# Patient Record
Sex: FEMALE | Race: WHITE | Employment: UNEMPLOYED | URBAN - METROPOLITAN AREA
[De-identification: names, ages, dates, MRNs, and addresses within clinical notes are randomized per-mention and may not be internally consistent; named-entity substitution may affect disease eponyms.]

---
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
## 2023-03-06 DIAGNOSIS — F41.1 GAD (GENERALIZED ANXIETY DISORDER): ICD-10-CM

## 2023-03-07 RX ORDER — BUSPIRONE HYDROCHLORIDE 5 MG/1
5 TABLET ORAL 2 TIMES DAILY
Qty: 60 TABLET | Refills: 1 | Status: SHIPPED | OUTPATIENT
Start: 2023-03-07

## 2023-03-07 NOTE — TELEPHONE ENCOUNTER
Patient mychart request for refill. Gael, ana    Last Visit: 11/22/22 NP Esteban  Next Appointment: 3/16/23 Esteban  Previous Refill Encounter(s): 10/12/22 60 + 1    Requested Prescriptions     Pending Prescriptions Disp Refills    busPIRone (BUSPAR) 5 mg tablet 60 Tablet 1     Sig: Take 1 Tablet by mouth two (2) times a day. For Pharmacy Admin Tracking Only    Program: Medication Refill  CPA in place:   Recommendation Provided To:    Intervention Detail: New Rx: 1, reason: Patient Preference  Intervention Accepted By:   Carroll Fink Closed?:   Time Spent (min): 5

## 2023-03-16 ENCOUNTER — OFFICE VISIT (OUTPATIENT)
Dept: FAMILY MEDICINE CLINIC | Age: 18
End: 2023-03-16

## 2023-03-16 VITALS
HEIGHT: 64 IN | TEMPERATURE: 99 F | OXYGEN SATURATION: 99 % | RESPIRATION RATE: 16 BRPM | SYSTOLIC BLOOD PRESSURE: 110 MMHG | BODY MASS INDEX: 50.02 KG/M2 | HEART RATE: 62 BPM | WEIGHT: 293 LBS | DIASTOLIC BLOOD PRESSURE: 73 MMHG

## 2023-03-16 DIAGNOSIS — Z00.00 ROUTINE GENERAL MEDICAL EXAMINATION AT A HEALTH CARE FACILITY: Primary | ICD-10-CM

## 2023-03-16 DIAGNOSIS — F41.1 GAD (GENERALIZED ANXIETY DISORDER): ICD-10-CM

## 2023-03-16 DIAGNOSIS — F90.0 ATTENTION DEFICIT HYPERACTIVITY DISORDER (ADHD), PREDOMINANTLY INATTENTIVE TYPE: ICD-10-CM

## 2023-03-16 DIAGNOSIS — Z13.1 SCREENING FOR DIABETES MELLITUS: ICD-10-CM

## 2023-03-16 DIAGNOSIS — Z13.220 SCREENING, LIPID: ICD-10-CM

## 2023-03-16 DIAGNOSIS — F90.0 ADHD, PREDOMINANTLY INATTENTIVE TYPE: ICD-10-CM

## 2023-03-16 DIAGNOSIS — E66.01 MORBID OBESITY WITH BMI OF 50.0-59.9, ADULT (HCC): ICD-10-CM

## 2023-03-16 DIAGNOSIS — K21.9 GASTROESOPHAGEAL REFLUX DISEASE WITHOUT ESOPHAGITIS: ICD-10-CM

## 2023-03-16 DIAGNOSIS — Z23 ENCOUNTER FOR IMMUNIZATION: ICD-10-CM

## 2023-03-16 RX ORDER — METHYLPHENIDATE HYDROCHLORIDE EXTENDED RELEASE 10 MG/1
10 TABLET ORAL DAILY
Qty: 30 TABLET | Refills: 0 | Status: SHIPPED | OUTPATIENT
Start: 2023-03-16 | End: 2023-04-15

## 2023-03-16 RX ORDER — METHYLPHENIDATE HYDROCHLORIDE 20 MG/1
20 TABLET ORAL 2 TIMES DAILY
COMMUNITY

## 2023-03-16 RX ORDER — METHYLPHENIDATE HYDROCHLORIDE EXTENDED RELEASE 10 MG/1
10 TABLET ORAL DAILY
Qty: 30 TABLET | Refills: 0 | Status: SHIPPED | OUTPATIENT
Start: 2023-04-15 | End: 2023-05-15

## 2023-03-16 RX ORDER — OMEPRAZOLE 20 MG/1
20 CAPSULE, DELAYED RELEASE ORAL DAILY
Qty: 30 CAPSULE | Refills: 3 | Status: SHIPPED | OUTPATIENT
Start: 2023-03-16

## 2023-03-16 RX ORDER — OMEPRAZOLE 20 MG/1
20 CAPSULE, DELAYED RELEASE ORAL DAILY
COMMUNITY
Start: 2023-02-09 | End: 2023-03-16 | Stop reason: SDUPTHER

## 2023-03-16 RX ORDER — METHYLPHENIDATE HYDROCHLORIDE EXTENDED RELEASE 10 MG/1
10 TABLET ORAL DAILY
Qty: 30 TABLET | Refills: 0 | Status: SHIPPED | OUTPATIENT
Start: 2023-05-15 | End: 2023-06-14

## 2023-03-16 NOTE — PROGRESS NOTES
Assessment/ Plan:   Full age appropriate History and Physical exam as well as health care maintenance  performed and discussed today. Risk factor modification discussed today includes safe sex practices, healthy diet and exercise, and seat belt use. Continue current medications. Diagnoses and all orders for this visit:    1. Routine general medical examination at a health care facility  -     CBC WITH AUTOMATED DIFF; Future  -     METABOLIC PANEL, COMPREHENSIVE; Future  -     TSH 3RD GENERATION; Future    2. Morbid obesity with BMI of 50.0-59.9, adult [facility])  We have discussed her candidacy for bariatric surgery and she will consider this option. 3. Screening for diabetes mellitus  -     HEMOGLOBIN A1C WITH EAG; Future    4. Screening, lipid  -     LIPID PANEL; Future    5. Gastroesophageal reflux disease without esophagitis  -     REFERRAL TO GASTROENTEROLOGY  -     omeprazole (PRILOSEC) 20 mg capsule; Take 1 Capsule by mouth daily. Continue Prilosec. Follow-up with gastroenterology. Following a low fod map diet. 6. ADHD, predominantly inattentive type  -     10-DRUG SCREEN W/CONF; Future    7. Attention deficit hyperactivity disorder (ADHD), predominantly inattentive type  -     methylphenidate HCl 10 mg SR tablet; Take 10 mg by mouth daily for 30 days. Max Daily Amount: 10 mg.  -     methylphenidate HCl 10 mg SR tablet; Take 10 mg by mouth daily for 30 days. Max Daily Amount: 10 mg.  -     methylphenidate HCl 10 mg SR tablet; Take 10 mg by mouth daily for 30 days. Max Daily Amount: 10 mg.    Length of supply given today: 3 months  Last Urine Drug Screen: Due today  Change to Dosage: No stable on current therapy.  Reviewed: Yes    Follow-up and Dispositions    Return in about 3 months (around 6/16/2023) for Follow Up. Discussed expected course/resolution/complications of diagnosis in detail with patient. Medication risks/benefits/costs/interactions/alternatives discussed with patient. Pt was given after visit summary which includes diagnoses, current medications & vitals. Pt expressed understanding with the diagnosis and plan      HPI:   Divina Castelan is a 19 y.o. female who presents for annual exam.    Hal Sit to the ER last month for abdominal pain. Was placed on Prilosec after a CT Abd was normal.  Symptoms are improving. ADHD   Divina Castelan is compliant with treatment regimen. Patient denies chest pain, shortness of breath, weight loss, insomnia, or difficulty with appetite. Symptoms are currently well controlled. Patient denies personal or family history of drug abuse. Previous neuropsychiatric evaluation is on file. Controlled substance contract is on file. She is eating healthy diet. She is increasing her routine aerobic activity. She has gained 40 pounds since 2021. She reports her mood is currently stable. She is in college. She has never been sexually active. Current Outpatient Medications   Medication Sig Dispense Refill    methylphenidate HCl (RITALIN) 20 mg tablet Take 20 mg by mouth two (2) times a day. omeprazole (PRILOSEC) 20 mg capsule Take 1 Capsule by mouth daily. 30 Capsule 3    methylphenidate HCl 10 mg SR tablet Take 10 mg by mouth daily for 30 days. Max Daily Amount: 10 mg. 30 Tablet 0    [START ON 5/15/2023] methylphenidate HCl 10 mg SR tablet Take 10 mg by mouth daily for 30 days. Max Daily Amount: 10 mg. 30 Tablet 0    [START ON 4/15/2023] methylphenidate HCl 10 mg SR tablet Take 10 mg by mouth daily for 30 days. Max Daily Amount: 10 mg. 30 Tablet 0    busPIRone (BUSPAR) 5 mg tablet Take 1 Tablet by mouth two (2) times a day. 60 Tablet 1    escitalopram oxalate (LEXAPRO) 20 mg tablet Take 1 Tablet by mouth nightly. 90 Tablet 1      No Known Allergies   There is no problem list on file for this patient.     Past Medical History:   Diagnosis Date    ADHD     Anxiety     H/O seasonal allergies       Past Surgical History:   Procedure Laterality Date    HX ORTHOPAEDIC      right ankle sx      No LMP recorded (lmp unknown). Family History   Problem Relation Age of Onset    No Known Problems Mother     High Cholesterol Father     No Known Problems Sister     No Known Problems Sister     Anesth Problems Neg Hx       Social History     Socioeconomic History    Marital status: SINGLE     Spouse name: Not on file    Number of children: Not on file    Years of education: Not on file    Highest education level: Not on file   Occupational History    Not on file   Tobacco Use    Smoking status: Never    Smokeless tobacco: Never   Vaping Use    Vaping Use: Never used   Substance and Sexual Activity    Alcohol use: Never    Drug use: Never    Sexual activity: Never   Other Topics Concern    Not on file   Social History Narrative    Not on file     Social Determinants of Health     Financial Resource Strain: Not on file   Food Insecurity: Not on file   Transportation Needs: Not on file   Physical Activity: Not on file   Stress: Not on file   Social Connections: Not on file   Intimate Partner Violence: Not on file   Housing Stability: Not on file        ROS:     Review of Systems   Constitutional:  Negative for fever, malaise/fatigue and weight loss. HENT:  Negative for hearing loss. Eyes:  Negative for blurred vision and pain. Respiratory:  Negative for cough and shortness of breath. Cardiovascular:  Negative for chest pain, palpitations and leg swelling. Gastrointestinal:  Positive for heartburn. Negative for abdominal pain, blood in stool, constipation, diarrhea and melena. Genitourinary:  Negative for dysuria and hematuria. Musculoskeletal:  Negative for joint pain. Skin:  Negative for rash. Neurological:  Negative for headaches. Psychiatric/Behavioral:  Negative for depression. The patient is not nervous/anxious and does not have insomnia.       Physical Exam:   Visit Vitals  /73 (BP 1 Location: Left upper arm, BP Patient Position: Sitting, BP Cuff Size: Large adult long)   Pulse 62   Temp 99 °F (37.2 °C)   Resp 16   Ht 5' 3.5\" (1.613 m)   Wt 295 lb 3.2 oz (133.9 kg)   LMP  (LMP Unknown)   SpO2 99%   BMI 51.47 kg/m²        Nursing Documentation and Vital Signs Reviewed. Physical Exam  Constitutional:       General: She is not in acute distress. Appearance: She is obese. HENT:      Right Ear: No drainage. No middle ear effusion. Tympanic membrane is not injected, erythematous or bulging. Left Ear: No drainage. No middle ear effusion. Tympanic membrane is not injected, erythematous or bulging. Eyes:      Pupils: Pupils are equal, round, and reactive to light. Neck:      Trachea: No tracheal deviation. Cardiovascular:      Pulses:           Dorsalis pedis pulses are 2+ on the right side and 2+ on the left side. Posterior tibial pulses are 2+ on the right side and 2+ on the left side. Heart sounds: S1 normal and S2 normal. No murmur heard. No friction rub. No gallop. Pulmonary:      Breath sounds: Normal breath sounds. No wheezing. Abdominal:      General: Bowel sounds are normal. There is no distension. Palpations: Abdomen is soft. There is no mass. Tenderness: There is no abdominal tenderness. Lymphadenopathy:      Cervical: No cervical adenopathy. Skin:     General: Skin is warm and dry. Neurological:      Cranial Nerves: No cranial nerve deficit. Sensory: Sensation is intact. Motor: Motor function is intact.

## 2023-03-16 NOTE — PROGRESS NOTES
Chief Complaint   Patient presents with    Complete Physical       1. Have you been to the ER, urgent care clinic since your last visit? Hospitalized since your last visit? Yes When: 2/2023 Where: [facility]     2. Have you seen or consulted any other health care providers outside of the [address] [place] [address] since your last visit? Include any pap smears or colon screening. No    3. For patients over 45: Has the patient had a colonoscopy? NA - based on age     If the patient is female:    4. For patients over 36: Has the patient had a mammogram? NA - based on age    11. For patients over 21: Has the patient had a pap smear? NA - based on age    1 most recent PHQ Screens 3/16/2023   Little interest or pleasure in doing things Not at all   Feeling down, depressed, irritable, or hopeless More than half the days   Total Score PHQ 2 2       Abuse Screening Questionnaire 3/16/2023   Do you ever feel afraid of your partner? N   Are you in a relationship with someone who physically or mentally threatens you? N   Is it safe for you to go home?  Y     Health Maintenance Due   Topic Date Due    Hepatitis C Screening  Never done    HPV Age 9Y-34Y (1 - 2-dose series) Never done    DTaP/Tdap/Td series (3 - Td or Tdap) 09/26/2014    COVID-19 Vaccine (2 - Moderna series) 04/23/2021    Flu Vaccine (1) 08/01/2022

## 2023-03-17 LAB
ALBUMIN SERPL-MCNC: 4 G/DL (ref 3.5–5)
ALBUMIN/GLOB SERPL: 1.1 (ref 1.1–2.2)
ALP SERPL-CCNC: 90 U/L (ref 45–117)
ALT SERPL-CCNC: 25 U/L (ref 12–78)
ANION GAP SERPL CALC-SCNC: 4 MMOL/L (ref 5–15)
AST SERPL-CCNC: 17 U/L (ref 15–37)
BASOPHILS # BLD: 0.1 K/UL (ref 0–0.1)
BASOPHILS NFR BLD: 1 % (ref 0–1)
BILIRUB SERPL-MCNC: 0.3 MG/DL (ref 0.2–1)
BUN SERPL-MCNC: 9 MG/DL (ref 6–20)
BUN/CREAT SERPL: 14 (ref 12–20)
CALCIUM SERPL-MCNC: 9.4 MG/DL (ref 8.5–10.1)
CHLORIDE SERPL-SCNC: 106 MMOL/L (ref 97–108)
CHOLEST SERPL-MCNC: 160 MG/DL
CO2 SERPL-SCNC: 27 MMOL/L (ref 21–32)
CREAT SERPL-MCNC: 0.63 MG/DL (ref 0.55–1.02)
DIFFERENTIAL METHOD BLD: ABNORMAL
EOSINOPHIL # BLD: 0.1 K/UL (ref 0–0.4)
EOSINOPHIL NFR BLD: 2 % (ref 0–7)
ERYTHROCYTE [DISTWIDTH] IN BLOOD BY AUTOMATED COUNT: 14.6 % (ref 11.5–14.5)
EST. AVERAGE GLUCOSE BLD GHB EST-MCNC: 108 MG/DL
GLOBULIN SER CALC-MCNC: 3.8 G/DL (ref 2–4)
GLUCOSE SERPL-MCNC: 90 MG/DL (ref 65–100)
HBA1C MFR BLD: 5.4 % (ref 4–5.6)
HCT VFR BLD AUTO: 39.7 % (ref 35–47)
HDLC SERPL-MCNC: 36 MG/DL
HDLC SERPL: 4.4 (ref 0–5)
HGB BLD-MCNC: 12 G/DL (ref 11.5–16)
IMM GRANULOCYTES # BLD AUTO: 0 K/UL (ref 0–0.04)
IMM GRANULOCYTES NFR BLD AUTO: 0 % (ref 0–0.5)
LDLC SERPL CALC-MCNC: 86.4 MG/DL (ref 0–100)
LYMPHOCYTES # BLD: 2.7 K/UL (ref 0.8–3.5)
LYMPHOCYTES NFR BLD: 36 % (ref 12–49)
MCH RBC QN AUTO: 24.4 PG (ref 26–34)
MCHC RBC AUTO-ENTMCNC: 30.2 G/DL (ref 30–36.5)
MCV RBC AUTO: 80.7 FL (ref 80–99)
MONOCYTES # BLD: 0.6 K/UL (ref 0–1)
MONOCYTES NFR BLD: 7 % (ref 5–13)
NEUTS SEG # BLD: 4 K/UL (ref 1.8–8)
NEUTS SEG NFR BLD: 54 % (ref 32–75)
NRBC # BLD: 0 K/UL (ref 0–0.01)
NRBC BLD-RTO: 0 PER 100 WBC
PLATELET # BLD AUTO: 449 K/UL (ref 150–400)
PMV BLD AUTO: 9.4 FL (ref 8.9–12.9)
POTASSIUM SERPL-SCNC: 4.2 MMOL/L (ref 3.5–5.1)
PROT SERPL-MCNC: 7.8 G/DL (ref 6.4–8.2)
RBC # BLD AUTO: 4.92 M/UL (ref 3.8–5.2)
SODIUM SERPL-SCNC: 137 MMOL/L (ref 136–145)
TRIGL SERPL-MCNC: 188 MG/DL (ref ?–150)
TSH SERPL DL<=0.05 MIU/L-ACNC: 3.04 UIU/ML (ref 0.36–3.74)
VLDLC SERPL CALC-MCNC: 37.6 MG/DL
WBC # BLD AUTO: 7.5 K/UL (ref 3.6–11)

## 2023-03-17 RX ORDER — ESCITALOPRAM OXALATE 20 MG/1
20 TABLET ORAL
Qty: 90 TABLET | Refills: 1 | Status: SHIPPED | OUTPATIENT
Start: 2023-03-17

## 2023-03-17 NOTE — TELEPHONE ENCOUNTER
Last Visit: 3/16/23 with MENA Orellana  Next Appointment: 6/16/23 with MENA Orellana  Previous Refill Encounter(s): 6/6/22 #90 with 1 refill    Requested Prescriptions     Pending Prescriptions Disp Refills    escitalopram oxalate (LEXAPRO) 20 mg tablet 90 Tablet 1     Sig: Take 1 Tablet by mouth nightly. For Pharmacy Admin Tracking Only    Program: Medication Refill  CPA in place: Recommendation Provided To:    Intervention Detail: New Rx: 1, reason: Patient Preference  Intervention Accepted By:   Perfecto Nunn Closed?:   Time Spent (min): 5

## 2023-03-18 LAB
AMPHETAMINES UR QL SCN: NEGATIVE NG/ML
BARBITURATES UR QL SCN: NEGATIVE NG/ML
BENZODIAZ UR QL SCN: NEGATIVE NG/ML
BZE UR QL SCN: NEGATIVE NG/ML
CANNABINOIDS UR QL SCN: NEGATIVE NG/ML
CREAT UR-MCNC: 148.2 MG/DL (ref 20–300)
LABORATORY COMMENT REPORT: NORMAL
METHADONE UR QL SCN: NEGATIVE NG/ML
OPIATES UR QL SCN: NEGATIVE NG/ML
OXYCODONE+OXYMORPHONE UR QL SCN: NEGATIVE NG/ML
PCP UR QL: NEGATIVE NG/ML
PH UR: 5.8 (ref 4.5–8.9)
PROPOXYPH UR QL SCN: NEGATIVE NG/ML

## 2023-04-21 ENCOUNTER — CLINICAL SUPPORT (OUTPATIENT)
Dept: FAMILY MEDICINE CLINIC | Age: 18
End: 2023-04-21

## 2023-04-21 DIAGNOSIS — Z23 ENCOUNTER FOR IMMUNIZATION: Primary | ICD-10-CM

## 2023-04-21 NOTE — PROGRESS NOTES
Eran Bueno is a 19 y.o. female  who presents for 2 nd HPV immunizations. she denies any symptoms , reactions or allergies that would exclude them from being immunized today. Risks and adverse reactions were discussed and the VIS was given to them. All questions were addressed. she was observed for 10 min post injection. There were no reactions observed. LOT: [id number]  NDC: 4736-4185-72  EXP.  DATE: 12/14/24    Sierra Bonner LPN

## 2023-05-02 RX ORDER — METHYLPHENIDATE HYDROCHLORIDE EXTENDED RELEASE 10 MG/1
10 TABLET ORAL DAILY
Qty: 30 TABLET | Refills: 0 | COMMUNITY
Start: 2023-05-15 | End: 2023-06-14

## 2023-06-21 NOTE — TELEPHONE ENCOUNTER
PCP: JAMAAL Chandra NP    Last appt: [unfilled]  Future Appointments   Date Time Provider [address]   9/18/2023  1:45 PM JAMAAL Lloyd NP [facility]       Requested Prescriptions     Pending Prescriptions Disp Refills    busPIRone (BUSPAR) 5 MG tablet       Sig: Take 1 tablet by mouth 2 times daily         Prior labs and Blood pressures:  BP Readings from Last 3 Encounters:   06/16/23 127/74   03/16/23 110/73   11/22/22 122/75     Lab Results   Component Value Date/Time     03/16/2023 10:24 AM    K 4.2 03/16/2023 10:24 AM     03/16/2023 10:24 AM    CO2 27 03/16/2023 10:24 AM    BUN 9 03/16/2023 10:24 AM    GFRAA >60 06/15/2021 02:49 PM     No results found for: HBA1C, [id number]  Lab Results   Component Value Date/Time    CHOL 160 03/16/2023 10:24 AM    HDL 36 03/16/2023 10:24 AM     No results found for: VITD3, VD3RIA    Lab Results   Component Value Date/Time    TSH 3.04 03/16/2023 10:24 AM

## 2023-06-22 RX ORDER — BUSPIRONE HYDROCHLORIDE 5 MG/1
5 TABLET ORAL 2 TIMES DAILY
Qty: 60 TABLET | Refills: 3 | Status: SHIPPED | OUTPATIENT
Start: 2023-06-22

## 2023-07-26 DIAGNOSIS — F90.0 ADHD, PREDOMINANTLY INATTENTIVE TYPE: ICD-10-CM

## 2023-07-26 RX ORDER — METHYLPHENIDATE HYDROCHLORIDE EXTENDED RELEASE 10 MG/1
10 TABLET ORAL DAILY
Qty: 30 TABLET | Refills: 0 | Status: CANCELLED | OUTPATIENT
Start: 2023-07-26 | End: 2023-08-25

## 2023-07-28 NOTE — TELEPHONE ENCOUNTER
Contacted [facility] pharmacy. Rx for methylphenidate on file and being processed for the 7/16/23 rx. Deleted request.  Thanks, [facility] Only    Program: Medication Refill  CPA in place:    Recommendation Provided To:    Intervention Detail: Discontinued Rx: 1, reason: Duplicate Therapy  Intervention Accepted By:   Mayi Mcdonald Closed?:    Time Spent (min): 5

## 2023-09-17 DIAGNOSIS — F90.0 ADHD, PREDOMINANTLY INATTENTIVE TYPE: ICD-10-CM

## 2023-09-19 RX ORDER — METHYLPHENIDATE HYDROCHLORIDE EXTENDED RELEASE 10 MG/1
10 TABLET ORAL DAILY
Qty: 30 TABLET | Refills: 0 | Status: SHIPPED | OUTPATIENT
Start: 2023-09-19 | End: 2023-10-19

## 2023-09-19 NOTE — TELEPHONE ENCOUNTER
PCP: JAMAAL Sue NP    Last appt: 6/16/2023   Future Appointments   Date Time Provider [address]   10/17/2023  1:15 PM JAMAAL Nance NP [facility]       Requested Prescriptions     Pending Prescriptions Disp Refills    methylphenidate (METADATE ER) 10 MG extended release tablet 30 tablet 0     Sig: Take 1 tablet by mouth daily for 30 days.  Max Daily Amount: 10 mg         Prior labs and Blood pressures:  BP Readings from Last 3 Encounters:   06/16/23 127/74   03/16/23 110/73   11/22/22 122/75     Lab Results   Component Value Date/Time     03/16/2023 10:24 AM    K 4.2 03/16/2023 10:24 AM     03/16/2023 10:24 AM    CO2 27 03/16/2023 10:24 AM    BUN 9 03/16/2023 10:24 AM    GFRAA >60 06/15/2021 02:49 PM     No results found for: \"HBA1C\", \"[id number]\"  Lab Results   Component Value Date/Time    CHOL 160 03/16/2023 10:24 AM    HDL 36 03/16/2023 10:24 AM     No results found for: \"VITD3\", \"VD3RIA\"    Lab Results   Component Value Date/Time    TSH 3.04 03/16/2023 10:24 AM

## 2023-10-17 ENCOUNTER — OFFICE VISIT (OUTPATIENT)
Age: 18
End: 2023-10-17
Payer: COMMERCIAL

## 2023-10-17 VITALS
HEIGHT: 64 IN | BODY MASS INDEX: 50.02 KG/M2 | DIASTOLIC BLOOD PRESSURE: 61 MMHG | RESPIRATION RATE: 16 BRPM | TEMPERATURE: 98.4 F | WEIGHT: 293 LBS | HEART RATE: 72 BPM | OXYGEN SATURATION: 98 % | SYSTOLIC BLOOD PRESSURE: 135 MMHG

## 2023-10-17 DIAGNOSIS — H91.91 HEARING LOSS OF RIGHT EAR, UNSPECIFIED HEARING LOSS TYPE: ICD-10-CM

## 2023-10-17 DIAGNOSIS — Z23 ENCOUNTER FOR IMMUNIZATION: ICD-10-CM

## 2023-10-17 DIAGNOSIS — F90.0 ADHD, PREDOMINANTLY INATTENTIVE TYPE: Primary | ICD-10-CM

## 2023-10-17 PROCEDURE — 90472 IMMUNIZATION ADMIN EACH ADD: CPT | Performed by: NURSE PRACTITIONER

## 2023-10-17 PROCEDURE — 90471 IMMUNIZATION ADMIN: CPT | Performed by: NURSE PRACTITIONER

## 2023-10-17 PROCEDURE — 90651 9VHPV VACCINE 2/3 DOSE IM: CPT | Performed by: NURSE PRACTITIONER

## 2023-10-17 PROCEDURE — 90674 CCIIV4 VAC NO PRSV 0.5 ML IM: CPT | Performed by: NURSE PRACTITIONER

## 2023-10-17 PROCEDURE — 99213 OFFICE O/P EST LOW 20 MIN: CPT | Performed by: NURSE PRACTITIONER

## 2023-10-17 RX ORDER — MOMETASONE FUROATE 50 UG/1
SPRAY, METERED NASAL
COMMUNITY
Start: 2013-07-02

## 2023-10-17 NOTE — PROGRESS NOTES
Assessment/Plan:     1. ADHD, predominantly inattentive type  -     methylphenidate (METADATE ER) 10 MG extended release tablet; Take 1 tablet by mouth daily for 30 days. Max Daily Amount: 10 mg, Disp-30 tablet, R-0Normal  -     methylphenidate (METADATE ER) 10 MG extended release tablet; Take 1 tablet by mouth daily for 30 days. Max Daily Amount: 10 mg, Disp-30 tablet, R-0Normal  Length of supply given today: 3 months  Last Urine Drug Screen: up to date  Change to Dosage: no, stable on current therapy.  Reviewed: Yes    2. Encounter for immunization  -     MS IM ADM PRQ ID SUBQ/IM NJXS 1 VACCINE  -     MS IM ADM PRQ ID SUBQ/IM NJXS EA VACCINE  -     HPV, GARDASIL 9, (AGE 9-45 YRS), IM  -     Influenza, FLUCELVAX, (age 10 mo+), IM, PF, 0.5 mL  3. Hearing loss of right ear, unspecified hearing loss type  -     External Referral To ENT for hearing evaluation. No follow-ups on file. Discussed expected course/resolution/complications of diagnosis in detail with patient. Medication risks/benefits/costs/interactions/alternatives discussed with patient. Pt was given after visit summary which includes diagnoses, current medications & vitals. Pt expressed understanding with the diagnosis and plan          Subjective:      Laura Palomo is a 19 y.o. female who presents for had concerns including Medication Check. ADHD   Laura Palomo is compliant with treatment regimen. Patient denies chest pain, shortness of breath, weight loss, insomnia, or difficulty with appetite. Symptoms are currently well controlled. Patient denies personal or family history of drug abuse. Previous neuropsychiatric evaluation is on file. Controlled substance contract is on file. She is concerned about hearing loss in the right ear.      Patient Active Problem List   Diagnosis    ADHD, predominantly inattentive type       Current Outpatient Medications   Medication Sig Dispense Refill    [START ON 11/20/2023]

## 2023-10-20 RX ORDER — ESCITALOPRAM OXALATE 20 MG/1
20 TABLET ORAL DAILY
Qty: 90 TABLET | Refills: 1 | Status: SHIPPED | OUTPATIENT
Start: 2023-10-20

## 2023-10-20 RX ORDER — BUSPIRONE HYDROCHLORIDE 5 MG/1
5 TABLET ORAL 2 TIMES DAILY
Qty: 60 TABLET | Refills: 3 | Status: SHIPPED | OUTPATIENT
Start: 2023-10-20

## 2023-10-20 NOTE — TELEPHONE ENCOUNTER
PCP: Maria Del Carmen Nunez APRN - NP    Last appt: 10/17/2023   Future Appointments   Date Time Provider [address]   1/10/2024  9:45 AM Maria Del Carmen Nunez APRN - NP [facility] AMB       Requested Prescriptions     Pending Prescriptions Disp Refills    busPIRone (BUSPAR) 5 MG tablet 60 tablet 3     Sig: Take 1 tablet by mouth 2 times daily    escitalopram (LEXAPRO) 20 MG tablet 30 tablet      Sig: Take 1 tablet by mouth         Prior labs and Blood pressures:  BP Readings from Last 3 Encounters:   10/17/23 135/61   06/16/23 127/74   03/16/23 110/73     Lab Results   Component Value Date/Time     03/16/2023 10:24 AM    K 4.2 03/16/2023 10:24 AM     03/16/2023 10:24 AM    CO2 27 03/16/2023 10:24 AM    BUN 9 03/16/2023 10:24 AM    GFRAA >60 06/15/2021 02:49 PM     No results found for: \"HBA1C\", \"LVT7IDPP\"  Lab Results   Component Value Date/Time    CHOL 160 03/16/2023 10:24 AM    HDL 36 03/16/2023 10:24 AM     No results found for: \"VITD3\", \"VD3RIA\"    Lab Results   Component Value Date/Time    TSH 3.04 03/16/2023 10:24 AM

## 2023-10-21 DIAGNOSIS — F90.0 ADHD, PREDOMINANTLY INATTENTIVE TYPE: ICD-10-CM

## 2023-10-21 RX ORDER — METHYLPHENIDATE HYDROCHLORIDE EXTENDED RELEASE 10 MG/1
10 TABLET ORAL DAILY
Qty: 30 TABLET | Refills: 0 | Status: CANCELLED | OUTPATIENT
Start: 2023-10-21 | End: 2023-11-20

## 2023-10-22 PROBLEM — F90.0 ADHD, PREDOMINANTLY INATTENTIVE TYPE: Status: ACTIVE | Noted: 2023-10-22

## 2023-10-22 RX ORDER — METHYLPHENIDATE HYDROCHLORIDE EXTENDED RELEASE 10 MG/1
10 TABLET ORAL DAILY
Qty: 30 TABLET | Refills: 0 | Status: SHIPPED | OUTPATIENT
Start: 2023-12-18 | End: 2024-01-17

## 2023-10-22 RX ORDER — METHYLPHENIDATE HYDROCHLORIDE EXTENDED RELEASE 10 MG/1
10 TABLET ORAL DAILY
Qty: 30 TABLET | Refills: 0 | Status: SHIPPED | OUTPATIENT
Start: 2023-10-22 | End: 2023-10-22 | Stop reason: SDUPTHER

## 2023-10-22 RX ORDER — METHYLPHENIDATE HYDROCHLORIDE EXTENDED RELEASE 10 MG/1
10 TABLET ORAL DAILY
Qty: 30 TABLET | Refills: 0 | Status: SHIPPED | OUTPATIENT
Start: 2023-11-20 | End: 2023-12-20

## 2023-10-22 ASSESSMENT — ENCOUNTER SYMPTOMS: SHORTNESS OF BREATH: 0

## 2023-10-24 RX ORDER — ESCITALOPRAM OXALATE 20 MG/1
20 TABLET ORAL DAILY
Qty: 90 TABLET | Refills: 1 | OUTPATIENT
Start: 2023-10-24

## 2024-01-04 DIAGNOSIS — F90.0 ADHD, PREDOMINANTLY INATTENTIVE TYPE: ICD-10-CM

## 2024-01-05 RX ORDER — METHYLPHENIDATE HYDROCHLORIDE EXTENDED RELEASE 10 MG/1
10 TABLET ORAL DAILY
Qty: 30 TABLET | Refills: 0 | Status: CANCELLED | OUTPATIENT
Start: 2024-01-05 | End: 2024-02-04

## 2024-01-05 NOTE — TELEPHONE ENCOUNTER
PCP: Ellen Nunez APRN - NP    Last appt: 10/17/2023   Future Appointments   Date Time Provider Department Center   1/10/2024  9:45 AM Ellen Nunez APRN - NP [facility] AMB       Requested Prescriptions     Pending Prescriptions Disp Refills    methylphenidate (METADATE ER) 10 MG extended release tablet 30 tablet 0     Sig: Take 1 tablet by mouth daily for 30 days. Max Daily Amount: 10 mg         Prior labs and Blood pressures:  BP Readings from Last 3 Encounters:   10/17/23 135/61   06/16/23 127/74   03/16/23 110/73     Lab Results   Component Value Date/Time     03/16/2023 10:24 AM    K 4.2 03/16/2023 10:24 AM     03/16/2023 10:24 AM    CO2 27 03/16/2023 10:24 AM    BUN 9 03/16/2023 10:24 AM    GFRAA >60 06/15/2021 02:49 PM     No results found for: \"HBA1C\", \"YDU1FIVA\"  Lab Results   Component Value Date/Time    CHOL 160 03/16/2023 10:24 AM    HDL 36 03/16/2023 10:24 AM     No results found for: \"VITD3\", \"VD3RIA\"    Lab Results   Component Value Date/Time    TSH 3.04 03/16/2023 10:24 AM

## 2024-01-10 ENCOUNTER — OFFICE VISIT (OUTPATIENT)
Age: 19
End: 2024-01-10
Payer: COMMERCIAL

## 2024-01-10 VITALS
SYSTOLIC BLOOD PRESSURE: 120 MMHG | RESPIRATION RATE: 16 BRPM | HEART RATE: 75 BPM | BODY MASS INDEX: 50.02 KG/M2 | WEIGHT: 293 LBS | DIASTOLIC BLOOD PRESSURE: 76 MMHG | TEMPERATURE: 98.5 F | OXYGEN SATURATION: 98 % | HEIGHT: 64 IN

## 2024-01-10 DIAGNOSIS — F90.0 ADHD, PREDOMINANTLY INATTENTIVE TYPE: Primary | ICD-10-CM

## 2024-01-10 PROCEDURE — 99213 OFFICE O/P EST LOW 20 MIN: CPT | Performed by: NURSE PRACTITIONER

## 2024-01-10 RX ORDER — METHYLPHENIDATE HYDROCHLORIDE EXTENDED RELEASE 10 MG/1
10 TABLET ORAL DAILY
Qty: 30 TABLET | Refills: 0 | Status: SHIPPED | OUTPATIENT
Start: 2024-01-10 | End: 2024-02-09

## 2024-01-10 RX ORDER — DICYCLOMINE HYDROCHLORIDE 10 MG/1
CAPSULE ORAL
COMMUNITY
Start: 2023-11-18

## 2024-01-10 ASSESSMENT — PATIENT HEALTH QUESTIONNAIRE - PHQ9
5. POOR APPETITE OR OVEREATING: 0
1. LITTLE INTEREST OR PLEASURE IN DOING THINGS: 0
9. THOUGHTS THAT YOU WOULD BE BETTER OFF DEAD, OR OF HURTING YOURSELF: 0
7. TROUBLE CONCENTRATING ON THINGS, SUCH AS READING THE NEWSPAPER OR WATCHING TELEVISION: 0
4. FEELING TIRED OR HAVING LITTLE ENERGY: 1
SUM OF ALL RESPONSES TO PHQ9 QUESTIONS 1 & 2: 0
6. FEELING BAD ABOUT YOURSELF - OR THAT YOU ARE A FAILURE OR HAVE LET YOURSELF OR YOUR FAMILY DOWN: 0
SUM OF ALL RESPONSES TO PHQ QUESTIONS 1-9: 2
SUM OF ALL RESPONSES TO PHQ QUESTIONS 1-9: 2
3. TROUBLE FALLING OR STAYING ASLEEP: 1
SUM OF ALL RESPONSES TO PHQ QUESTIONS 1-9: 2
SUM OF ALL RESPONSES TO PHQ QUESTIONS 1-9: 2
8. MOVING OR SPEAKING SO SLOWLY THAT OTHER PEOPLE COULD HAVE NOTICED. OR THE OPPOSITE, BEING SO FIGETY OR RESTLESS THAT YOU HAVE BEEN MOVING AROUND A LOT MORE THAN USUAL: 0
2. FEELING DOWN, DEPRESSED OR HOPELESS: 0
10. IF YOU CHECKED OFF ANY PROBLEMS, HOW DIFFICULT HAVE THESE PROBLEMS MADE IT FOR YOU TO DO YOUR WORK, TAKE CARE OF THINGS AT HOME, OR GET ALONG WITH OTHER PEOPLE: 1

## 2024-01-10 ASSESSMENT — ENCOUNTER SYMPTOMS: SHORTNESS OF BREATH: 0

## 2024-01-10 NOTE — PROGRESS NOTES
Chief Complaint   Patient presents with    Follow-up     \"Have you been to the ER, urgent care clinic since your last visit?  Hospitalized since your last visit?\"    NO    “Have you seen or consulted any other health care providers outside of [facility] since your last visit?”    NO

## 2024-01-10 NOTE — PROGRESS NOTES
Assessment/Plan:     1. ADHD, predominantly inattentive type  -     methylphenidate (METADATE ER) 10 MG extended release tablet; Take 1 tablet by mouth daily for 30 days. Max Daily Amount: 10 mg, Disp-30 tablet, R-0Normal  Confirmed via her pharmacy in the  that she is only filled 1 prescription since her last 3-month follow-up and has 2 additional refills on file.  We will send the third additional refill today and we will see her back in 3 months or sooner as needed.  No medication changes today.   reviewed and appropriate.  UDS on file.    No follow-ups on file.     Discussed expected course/resolution/complications of diagnosis in detail with patient.    Medication risks/benefits/costs/interactions/alternatives discussed with patient.    Pt was given after visit summary which includes diagnoses, current medications & vitals.   Pt expressed understanding with the diagnosis and plan          Subjective:      Yanna Curtis is a 19 y.o. female who presents for had concerns including Follow-up.     ADHD   Yanna Curtis is compliant with treatment regimen.  Patient denies chest pain, shortness of breath, weight loss, insomnia, or difficulty with appetite. Symptoms are currently well controlled.  Patient denies personal or family history of drug abuse. Previous neuropsychiatric evaluation is on file. Controlled substance contract is on file.      She has EGD scheduled for this month.  There is some concern regarding POTS and she may require further diagnostic testing.    Patient Active Problem List   Diagnosis    ADHD, predominantly inattentive type       Current Outpatient Medications   Medication Sig Dispense Refill    dicyclomine (BENTYL) 10 MG capsule TAKE ONE CAPSULE BY MOUTH EVERY 6 HOURS AS NEEDED FOR CRAMPS      methylphenidate (METADATE ER) 10 MG extended release tablet Take 1 tablet by mouth daily for 30 days. Max Daily Amount: 10 mg 30 tablet 0    busPIRone (BUSPAR) 5 MG tablet Take 1 tablet by

## 2024-01-29 ENCOUNTER — ANESTHESIA EVENT (OUTPATIENT)
Facility: HOSPITAL | Age: 19
End: 2024-01-29
Payer: COMMERCIAL

## 2024-01-29 ENCOUNTER — ANESTHESIA (OUTPATIENT)
Facility: HOSPITAL | Age: 19
End: 2024-01-29
Payer: COMMERCIAL

## 2024-01-29 ENCOUNTER — HOSPITAL ENCOUNTER (OUTPATIENT)
Facility: HOSPITAL | Age: 19
Setting detail: OUTPATIENT SURGERY
Discharge: HOME | End: 2024-01-29
Attending: INTERNAL MEDICINE | Admitting: INTERNAL MEDICINE
Payer: COMMERCIAL

## 2024-01-29 VITALS
OXYGEN SATURATION: 93 % | HEIGHT: 63 IN | TEMPERATURE: 98.2 F | DIASTOLIC BLOOD PRESSURE: 67 MMHG | BODY MASS INDEX: 51.91 KG/M2 | HEART RATE: 63 BPM | SYSTOLIC BLOOD PRESSURE: 118 MMHG | WEIGHT: 293 LBS | RESPIRATION RATE: 18 BRPM

## 2024-01-29 LAB — HCG UR QL: NEGATIVE

## 2024-01-29 PROCEDURE — 7100000011 HC PHASE II RECOVERY - ADDTL 15 MIN: Performed by: INTERNAL MEDICINE

## 2024-01-29 PROCEDURE — 2709999900 HC NON-CHARGEABLE SUPPLY: Performed by: INTERNAL MEDICINE

## 2024-01-29 PROCEDURE — 7100000010 HC PHASE II RECOVERY - FIRST 15 MIN: Performed by: INTERNAL MEDICINE

## 2024-01-29 PROCEDURE — 88305 TISSUE EXAM BY PATHOLOGIST: CPT

## 2024-01-29 PROCEDURE — 3700000000 HC ANESTHESIA ATTENDED CARE: Performed by: INTERNAL MEDICINE

## 2024-01-29 PROCEDURE — 6360000002 HC RX W HCPCS

## 2024-01-29 PROCEDURE — 2500000003 HC RX 250 WO HCPCS

## 2024-01-29 PROCEDURE — 2580000003 HC RX 258

## 2024-01-29 PROCEDURE — 3600007512: Performed by: INTERNAL MEDICINE

## 2024-01-29 PROCEDURE — 81025 URINE PREGNANCY TEST: CPT

## 2024-01-29 PROCEDURE — 3600007502: Performed by: INTERNAL MEDICINE

## 2024-01-29 PROCEDURE — 3700000001 HC ADD 15 MINUTES (ANESTHESIA): Performed by: INTERNAL MEDICINE

## 2024-01-29 RX ORDER — TIZANIDINE 4 MG/1
4 TABLET ORAL 3 TIMES DAILY
COMMUNITY

## 2024-01-29 RX ORDER — SODIUM CHLORIDE 9 MG/ML
25 INJECTION, SOLUTION INTRAVENOUS PRN
Status: DISCONTINUED | OUTPATIENT
Start: 2024-01-29 | End: 2024-01-29 | Stop reason: HOSPADM

## 2024-01-29 RX ORDER — SODIUM CHLORIDE 9 MG/ML
INJECTION, SOLUTION INTRAVENOUS CONTINUOUS PRN
Status: DISCONTINUED | OUTPATIENT
Start: 2024-01-29 | End: 2024-01-29 | Stop reason: SDUPTHER

## 2024-01-29 RX ORDER — SODIUM CHLORIDE 0.9 % (FLUSH) 0.9 %
5-40 SYRINGE (ML) INJECTION PRN
Status: DISCONTINUED | OUTPATIENT
Start: 2024-01-29 | End: 2024-01-29 | Stop reason: HOSPADM

## 2024-01-29 RX ORDER — SODIUM CHLORIDE 9 MG/ML
INJECTION, SOLUTION INTRAVENOUS CONTINUOUS
Status: DISCONTINUED | OUTPATIENT
Start: 2024-01-29 | End: 2024-01-29 | Stop reason: HOSPADM

## 2024-01-29 RX ORDER — LIDOCAINE HYDROCHLORIDE 20 MG/ML
INJECTION, SOLUTION EPIDURAL; INFILTRATION; INTRACAUDAL; PERINEURAL PRN
Status: DISCONTINUED | OUTPATIENT
Start: 2024-01-29 | End: 2024-01-29 | Stop reason: SDUPTHER

## 2024-01-29 RX ORDER — SODIUM CHLORIDE 0.9 % (FLUSH) 0.9 %
5-40 SYRINGE (ML) INJECTION EVERY 12 HOURS SCHEDULED
Status: DISCONTINUED | OUTPATIENT
Start: 2024-01-29 | End: 2024-01-29 | Stop reason: HOSPADM

## 2024-01-29 RX ADMIN — LIDOCAINE HYDROCHLORIDE 100 MG: 20 INJECTION, SOLUTION EPIDURAL; INFILTRATION; INTRACAUDAL; PERINEURAL at 11:07

## 2024-01-29 RX ADMIN — PROPOFOL 50 MG: 10 INJECTION, EMULSION INTRAVENOUS at 11:14

## 2024-01-29 RX ADMIN — SODIUM CHLORIDE: 9 INJECTION, SOLUTION INTRAVENOUS at 11:00

## 2024-01-29 RX ADMIN — PROPOFOL 100 MG: 10 INJECTION, EMULSION INTRAVENOUS at 11:07

## 2024-01-29 RX ADMIN — PROPOFOL 50 MG: 10 INJECTION, EMULSION INTRAVENOUS at 11:08

## 2024-01-29 RX ADMIN — PROPOFOL 50 MG: 10 INJECTION, EMULSION INTRAVENOUS at 11:10

## 2024-01-29 RX ADMIN — PROPOFOL 50 MG: 10 INJECTION, EMULSION INTRAVENOUS at 11:12

## 2024-01-29 ASSESSMENT — PAIN - FUNCTIONAL ASSESSMENT
PAIN_FUNCTIONAL_ASSESSMENT: NONE - DENIES PAIN
PAIN_FUNCTIONAL_ASSESSMENT: NONE - DENIES PAIN

## 2024-01-29 NOTE — DISCHARGE INSTRUCTIONS
[facility]  [address]  [place] [address]  [phone]                                  Yannaian uCrtis  [id number]  2005    It was my pleasure seeing you for your procedure.  You will also receive a summary report with the findings from this procedure and any further recommendations.  If you had polyps removed or biopsies taken during your procedure, you will receive a separate letter from me within approximately the next 2 weeks.  If you don't receive this letter or if you have any questions, please call my office [phone].     Please take note of the post procedure instructions listed below.    Pedro Wishes,    Dr. Galindo      CARE FOLLOWING YOUR PROCEDURE    These instructions give you information on caring for yourself after your procedure. Call your doctor if you have any problems or questions after your procedure.    HOME CARE  Walk if you have belly cramping or gas.  Walking will help get rid of the air and reduce the bloated feeling in your belly (abdomen).  Your IV site (where you received drugs) may be tender to touch.  Place warm towels on the site; keep your arm up on two pillows if you have any swelling or soreness in the area.  You may shower.    ACTIVITY:  Take frequent rest periods and move at a slower pace for the next 24 hours..  You may resume your regular activity tomorrow if you are feeling back to normal.  Do not drive or ride a bicycle for at least 24 hours (because of the medicine (anesthesia) used during the test).  Do not sign any important legal documents or use or operate any machinery for 24 hours  Do not take sleeping medicines/nerve drugs for 24 hours unless the doctor tells you.  You can return to work/school tomorrow unless otherwise instructed.    NUTRITION:  Drink plenty of fluids to keep your pee (urine) clear or pale yellow  Begin with a light meal and progress to your normal diet. Heavy or fried foods are harder to digest and may  decreased ROM/pain/decreased strength

## 2024-01-29 NOTE — H&P
[facility]  [address]  [place] [address] [phone]                                History and Physical     NAME: Yanna Curtis   :  2005   MRN:  [id number]     HPI:  The patient was seen and examined.    Past Surgical History:   Procedure Laterality Date    ORTHOPEDIC SURGERY      right ankle sx     Past Medical History:   Diagnosis Date    ADHD     Anxiety     H/O seasonal allergies      Social History     Tobacco Use    Smoking status: Never    Smokeless tobacco: Never   Vaping Use    Vaping Use: Never used   Substance Use Topics    Alcohol use: Never    Drug use: Never     No Known Allergies  Family History   Problem Relation Age of Onset    No Known Problems Sister     No Known Problems Sister     High Cholesterol Father     No Known Problems Mother     Anesth Problems Neg Hx      No current facility-administered medications for this encounter.         PHYSICAL EXAM:  General: WD, WN. Alert, cooperative, no acute distress    HEENT: NC, Atraumatic.  PERRLA, EOMI. Anicteric sclerae.  Lungs:  CTA Bilaterally. No Wheezing/Rhonchi/Rales.  Heart:  Regular  rhythm,  No murmur, No Rubs, No Gallops  Abdomen: Soft, Non distended, Non tender.  +Bowel sounds, no HSM  Extremities: No c/c/e  Neurologic:  CN 2-12 gi, Alert and oriented X 3.  No acute neurological distress   Psych:   Good insight. Not anxious nor agitated.    The heart, lungs and mental status were satisfactory for the administration of mac sedation and for the procedure.      Mallampati score: 2     The patient was counseled at length about the risks of mariam Covid-19 in the [place]-operative and post-operative states including the recovery window of their procedure.  The patient was made aware that mariam Covid-19 after a surgical procedure may worsen their prognosis for recovering from the virus and lend to a higher morbidity and or mortality risk.  The patient was given the options of postponing

## 2024-01-29 NOTE — ANESTHESIA POSTPROCEDURE EVALUATION
Department of Anesthesiology  Postprocedure Note    Patient: Yanna Curtis  MRN: [id number]  YOB: 2005  Date of evaluation: 1/29/2024    Procedure Summary       Date: 01/29/24 Room / Location: [facility] ENDO 04 / [facility] ENDOSCOPY    Anesthesia Start: 1104 Anesthesia Stop: 1116    Procedure: EGD ESOPHAGOGASTRODUODENOSCOPY with biopsy Diagnosis:       Nausea and vomiting, unspecified vomiting type      Gastroesophageal reflux disease, unspecified whether esophagitis present      Left upper quadrant pain      (Nausea and vomiting, unspecified vomiting type [R11.2])      (Gastroesophageal reflux disease, unspecified whether esophagitis present [K21.9])      (Left upper quadrant pain [R10.12])    Surgeons: Dianelys Galindo MD Responsible Provider: Louis Maier MD    Anesthesia Type: MAC ASA Status: 3            Anesthesia Type: MAC    Barby Phase I: Barby Score: 10    Barby Phase II: Barby Score: 10    Anesthesia Post Evaluation  Post-Anesthesia Evaluation and Assessment    Patient: Yanna Curtis MRN: [id number]  SSN: [id number]    YOB: 2005  Age: 19 y.o.  Sex: female      I have evaluated the patient and they are stable and ready for discharge from the PACU.     Cardiovascular Function/Vital Signs  Visit Vitals  /67   Pulse 63   Temp 98.2 °F (36.8 °C) (Temporal)   Resp 18   Ht 1.6 m (5' 3\")   Wt (!) 141.5 kg (312 lb)   SpO2 93%   BMI 55.27 kg/m²       Patient is status post Monitor Anesthesia Care anesthesia for Procedure(s):  EGD ESOPHAGOGASTRODUODENOSCOPY with biopsy.    Nausea/Vomiting: None    Postoperative hydration reviewed and adequate.    Pain:      Managed    Neurological Status:       At baseline    Mental Status, Level of Consciousness: Alert and  oriented to person, place, and time    Pulmonary Status:       Adequate oxygenation and airway patent    Complications related to anesthesia: None    Post-anesthesia assessment completed. No concerns    Signed By: Louis

## 2024-01-29 NOTE — ANESTHESIA PRE PROCEDURE
Counseling given: Not Answered      Vital Signs (Current): There were no vitals filed for this visit.                                           BP Readings from Last 3 Encounters:   01/10/24 120/76   10/17/23 135/61   06/16/23 127/74       NPO Status:                                                                                 BMI:   Wt Readings from Last 3 Encounters:   01/10/24 (!) 141.7 kg (312 lb 6.4 oz)   10/17/23 (!) 139.5 kg (307 lb 9.6 oz)   06/16/23 132.8 kg (292 lb 12.8 oz)     There is no height or weight on file to calculate BMI.    CBC:   Lab Results   Component Value Date/Time    WBC 7.5 03/16/2023 10:24 AM    RBC 4.92 03/16/2023 10:24 AM    HGB 12.0 03/16/2023 10:24 AM    HCT 39.7 03/16/2023 10:24 AM    MCV 80.7 03/16/2023 10:24 AM    RDW 14.6 03/16/2023 10:24 AM     03/16/2023 10:24 AM       CMP:   Lab Results   Component Value Date/Time     03/16/2023 10:24 AM    K 4.2 03/16/2023 10:24 AM     03/16/2023 10:24 AM    CO2 27 03/16/2023 10:24 AM    BUN 9 03/16/2023 10:24 AM    CREATININE 0.63 03/16/2023 10:24 AM    GFRAA >60 06/15/2021 02:49 PM    AGRATIO 1.1 03/16/2023 10:24 AM    GLUCOSE 90 03/16/2023 10:24 AM    PROT 7.8 03/16/2023 10:24 AM    CALCIUM 9.4 03/16/2023 10:24 AM    BILITOT 0.3 03/16/2023 10:24 AM    ALKPHOS 90 03/16/2023 10:24 AM    AST 17 03/16/2023 10:24 AM    ALT 25 03/16/2023 10:24 AM       POC Tests: No results for input(s): \"POCGLU\", \"POCNA\", \"POCK\", \"POCCL\", \"POCBUN\", \"POCHEMO\", \"POCHCT\" in the last 72 hours.    Coags: No results found for: \"PROTIME\", \"INR\", \"APTT\"    HCG (If Applicable): No results found for: \"PREGTESTUR\", \"PREGSERUM\", \"HCG\", \"HCGQUANT\"     ABGs: No results found for: \"PHART\", \"PO2ART\", \"DEY9VAV\", \"UND4CRT\", \"BEART\", \"O2WTKUIG\"     Type & Screen (If Applicable):  No results found for: \"LABABO\", \"LABRH\"    Drug/Infectious Status (If Applicable):  No results found for: \"HIV\", \"HEPCAB\"    COVID-19 Screening (If Applicable):

## 2024-01-29 NOTE — OP NOTE
medications.    Discharge disposition:  Home in the company of  when able to ambulate    Dianelys Galindo MD

## 2024-02-16 DIAGNOSIS — F90.0 ADHD, PREDOMINANTLY INATTENTIVE TYPE: ICD-10-CM

## 2024-02-16 NOTE — TELEPHONE ENCOUNTER
PCP: Ellen Nunez APRN - NP      Future Appointments   Date Time Provider Department Center   4/8/2024  9:15 AM Ellen Nunez APRN - NP [facility] AMB       Requested Prescriptions     Pending Prescriptions Disp Refills    methylphenidate (METADATE ER) 10 MG extended release tablet 30 tablet 0     Sig: Take 1 tablet by mouth daily for 30 days. Max Daily Amount: 10 mg

## 2024-02-20 RX ORDER — METHYLPHENIDATE HYDROCHLORIDE EXTENDED RELEASE 10 MG/1
10 TABLET ORAL DAILY
Qty: 30 TABLET | Refills: 0 | Status: SHIPPED | OUTPATIENT
Start: 2024-02-20 | End: 2024-03-21

## 2024-02-20 NOTE — TELEPHONE ENCOUNTER
NP Enrique,    Contacted [facility] pharmacy:  Last rx was 1/10/24 and picked up 1/10/24.  No rx's remain per [facility].  Thanks, Tonya      Last appointment: 1/10/24 Enrique  Next appointment: 4/8/24 Enrique  Previous refill encounter(s): 1/10/24 30    Requested Prescriptions     Pending Prescriptions Disp Refills    methylphenidate (METADATE ER) 10 MG extended release tablet 30 tablet 0     Sig: Take 1 tablet by mouth daily for 30 days. Max Daily Amount: 10 mg     For Pharmacy Admin Tracking Only    Program: Medication Refill  CPA in place:    Recommendation Provided To:   Intervention Detail: New Rx: 1, reason: Patient Preference  Intervention Accepted By:   Gap Closed?:    Time Spent (min): 5

## 2024-03-04 ENCOUNTER — OFFICE VISIT (OUTPATIENT)
Age: 19
End: 2024-03-04
Payer: COMMERCIAL

## 2024-03-04 VITALS
WEIGHT: 293 LBS | OXYGEN SATURATION: 98 % | TEMPERATURE: 97.5 F | DIASTOLIC BLOOD PRESSURE: 92 MMHG | HEART RATE: 80 BPM | SYSTOLIC BLOOD PRESSURE: 140 MMHG | HEIGHT: 63 IN | BODY MASS INDEX: 51.91 KG/M2

## 2024-03-04 DIAGNOSIS — F07.81 POST CONCUSSION SYNDROME: Primary | ICD-10-CM

## 2024-03-04 PROCEDURE — 99213 OFFICE O/P EST LOW 20 MIN: CPT

## 2024-03-04 ASSESSMENT — ENCOUNTER SYMPTOMS
SHORTNESS OF BREATH: 0
COUGH: 0
CONSTIPATION: 0
PHOTOPHOBIA: 1
ABDOMINAL DISTENTION: 0

## 2024-03-04 NOTE — PROGRESS NOTES
Chief Complaint   Patient presents with    Concussion     Pt woke up on floor with a swollen head. Pt stated she blacked out and does remember what happened before the fall. Was sent to [facility] did scans, and they were all clear. Diagnosed with a concussion, was given medications, and then sent home. Pt is having blurred vision, constant headaches (makes physically ill), focusing issues, memory issues, ear pain, light sensitivity, and balance issues.      \"Have you been to the ER, urgent care clinic since your last visit?  Hospitalized since your last visit?\"    YES - When: approximately 1 months ago.  Where and Why: [facility] .    “Have you seen or consulted any other health care providers outside of [facility] since your last visit?”    NO                   1/10/2024     9:56 AM   PHQ-9    Little interest or pleasure in doing things 0   Feeling down, depressed, or hopeless 0   Trouble falling or staying asleep, or sleeping too much 1   Feeling tired or having little energy 1   Poor appetite or overeating 0   Feeling bad about yourself - or that you are a failure or have let yourself or your family down 0   Trouble concentrating on things, such as reading the newspaper or watching television 0   Moving or speaking so slowly that other people could have noticed. Or the opposite - being so fidgety or restless that you have been moving around a lot more than usual 0   Thoughts that you would be better off dead, or of hurting yourself in some way 0   PHQ-2 Score 0   PHQ-9 Total Score 2   If you checked off any problems, how difficult have these problems made it for you to do your work, take care of things at home, or get along with other people? 1           Financial Resource Strain: Low Risk  (6/15/2023)    Overall Financial Resource Strain (CARDIA)     Difficulty of Paying Living Expenses: Not very hard      Food Insecurity: Not on file (6/15/2023)   Recent Concern: 
[facility]  [facility] note provided per patient request.      Juliette Singh, FNP-BC

## 2024-03-07 ENCOUNTER — OFFICE VISIT (OUTPATIENT)
Facility: CLINIC | Age: 19
End: 2024-03-07

## 2024-03-07 VITALS
SYSTOLIC BLOOD PRESSURE: 107 MMHG | RESPIRATION RATE: 16 BRPM | HEIGHT: 63 IN | WEIGHT: 293 LBS | BODY MASS INDEX: 51.91 KG/M2 | TEMPERATURE: 98.6 F | HEART RATE: 86 BPM | OXYGEN SATURATION: 94 % | DIASTOLIC BLOOD PRESSURE: 71 MMHG

## 2024-03-07 DIAGNOSIS — S06.0X0A CONCUSSION WITHOUT LOSS OF CONSCIOUSNESS, INITIAL ENCOUNTER: Primary | ICD-10-CM

## 2024-03-07 DIAGNOSIS — M62.838 NECK MUSCLE SPASM: ICD-10-CM

## 2024-03-07 NOTE — PATIENT INSTRUCTIONS
Patient Education        Concussion: Care Instructions  Overview     A concussion is a kind of injury to the brain. It happens when the head receives a hard blow. The impact can jar or shake the brain against the skull. This interrupts the brain's normal activities. Although you may have cuts or bruises on your head or face, you may have no other visible signs of a brain injury. In most cases, damage to the brain from a concussion can't be seen in tests such as a CT or MRI scan.  For a few weeks, you may have low energy, dizziness, trouble sleeping, a headache, ringing in your ears, or nausea. You may also feel anxious, grumpy, or depressed. You may have problems with memory and concentration. These symptoms are common after a concussion. They should slowly improve over time. Sometimes this takes weeks or even months. Someone who lives with you should know how to care for you. Please share this and all information with a caregiver who will be available to help if needed.  Follow-up care is a key part of your treatment and safety. Be sure to make and go to all appointments, and call your doctor if you are having problems. It's also a good idea to know your test results and keep a list of the medicines you take.  How can you care for yourself at home?  Pain control  Put ice or a cold pack on the part of your head that hurts for 10 to 20 minutes at a time. Put a thin cloth between the ice and your skin.  Ask your doctor if you can take an over-the-counter pain medicine, such as acetaminophen (Tylenol), ibuprofen (Advil, Motrin), or naproxen (Aleve). Be safe with medicines. Read and follow all instructions on the label.  Recovery  Follow your doctor's instructions. The doctor will tell you if you need someone to watch you closely for the next 24 hours or longer.  Rest is the best way to recover from a concussion. You need to rest your body and your brain:  Get plenty of sleep at night. And take rest breaks during the

## 2024-03-07 NOTE — PROGRESS NOTES
training)   5. full contact training after medical clearance   6. return to competition (game play)     There should be approximately 24 hours (or longer) for each stage and the athlete should return to stage 1 if symptoms recur. Resistance training should only be added in the later stages. Medical clearance should be given before return to play.     Follow up in 14 Days, will consider Impact/Scat3 test at next visit    I have discussed the diagnosis with the patient and the intended plan as seen in the above orders.  The patient has received an after-visit summary and questions were answered concerning future plans.     Medication Side Effects and Warnings were discussed with patient,  Patient Labs were reviewed and or requested, and  Patient Past Records were reviewed and or requested  Yes       Pt agrees to call or return to clinic and/or go to closest ER with any worsening of symptoms.  This may include, but not limited to increased fever (>100.4) with NSAIDS or Tylenol, increased edema, confusion, rash, worsening of presenting symptoms.    Please note that this dictation was completed with [employer], the computer voice recognition software.  Quite often unanticipated grammatical, syntax, homophones, and other interpretive errors are inadvertently transcribed by the computer software.  Please disregard these errors.  Please excuse any errors that have escaped final proofreading.  Thank you.

## 2024-03-24 DIAGNOSIS — F90.0 ADHD, PREDOMINANTLY INATTENTIVE TYPE: ICD-10-CM

## 2024-03-25 RX ORDER — METHYLPHENIDATE HYDROCHLORIDE EXTENDED RELEASE 10 MG/1
10 TABLET ORAL DAILY
Qty: 30 TABLET | Refills: 0 | Status: SHIPPED | OUTPATIENT
Start: 2024-03-25 | End: 2024-04-24

## 2024-03-25 NOTE — TELEPHONE ENCOUNTER
PCP: Ellen Nunez APRN - NP    Last appt: 3/4/2024   Future Appointments   Date Time Provider Department Center   4/4/2024  3:45 PM Maxwell Tovar MD [facility] BS AMB   4/8/2024  9:15 AM Ellen Nunez APRN - NP [facility] BS AMB       Requested Prescriptions     Pending Prescriptions Disp Refills    methylphenidate (METADATE ER) 10 MG extended release tablet 30 tablet 0     Sig: Take 1 tablet by mouth daily for 30 days. Max Daily Amount: 10 mg         Prior labs and Blood pressures:  BP Readings from Last 3 Encounters:   03/07/24 107/71   03/04/24 (!) 140/92   01/29/24 118/67     Lab Results   Component Value Date/Time     03/16/2023 10:24 AM    K 4.2 03/16/2023 10:24 AM     03/16/2023 10:24 AM    CO2 27 03/16/2023 10:24 AM    BUN 9 03/16/2023 10:24 AM    GFRAA >60 06/15/2021 02:49 PM     No results found for: \"HBA1C\", \"CZA5VCFW\"  Lab Results   Component Value Date/Time    CHOL 160 03/16/2023 10:24 AM    HDL 36 03/16/2023 10:24 AM     No results found for: \"VITD3\", \"VD3RIA\"    Lab Results   Component Value Date/Time    TSH 3.04 03/16/2023 10:24 AM

## 2024-04-04 ENCOUNTER — TELEMEDICINE (OUTPATIENT)
Facility: CLINIC | Age: 19
End: 2024-04-04
Payer: COMMERCIAL

## 2024-04-04 DIAGNOSIS — S06.0X0A CONCUSSION WITHOUT LOSS OF CONSCIOUSNESS, INITIAL ENCOUNTER: Primary | ICD-10-CM

## 2024-04-04 DIAGNOSIS — M62.838 NECK MUSCLE SPASM: ICD-10-CM

## 2024-04-04 DIAGNOSIS — F41.9 ANXIETY AND DEPRESSION: ICD-10-CM

## 2024-04-04 DIAGNOSIS — F32.A ANXIETY AND DEPRESSION: ICD-10-CM

## 2024-04-04 DIAGNOSIS — F90.0 ADHD, PREDOMINANTLY INATTENTIVE TYPE: ICD-10-CM

## 2024-04-04 PROCEDURE — 96116 NUBHVL XM PHYS/QHP 1ST HR: CPT | Performed by: FAMILY MEDICINE

## 2024-04-04 PROCEDURE — 99214 OFFICE O/P EST MOD 30 MIN: CPT | Performed by: FAMILY MEDICINE

## 2024-04-04 ASSESSMENT — PATIENT HEALTH QUESTIONNAIRE - PHQ9
2. FEELING DOWN, DEPRESSED OR HOPELESS: NOT AT ALL
SUM OF ALL RESPONSES TO PHQ QUESTIONS 1-9: 0
SUM OF ALL RESPONSES TO PHQ QUESTIONS 1-9: 0
1. LITTLE INTEREST OR PLEASURE IN DOING THINGS: NOT AT ALL
SUM OF ALL RESPONSES TO PHQ QUESTIONS 1-9: 0
SUM OF ALL RESPONSES TO PHQ9 QUESTIONS 1 & 2: 0
SUM OF ALL RESPONSES TO PHQ QUESTIONS 1-9: 0

## 2024-04-04 NOTE — PATIENT INSTRUCTIONS
sure that you can stay at each new level of activity for at least 24 hours without symptoms, or as long as your doctor says, before doing more. If one or more symptoms come back, return to a lower level of activity for at least 24 hours. Don't move on until all symptoms are gone.  When should you call for help?   Call 911 anytime you think you may need emergency care. For example, call if:    You have a seizure.     You passed out (lost consciousness).     You are confused or can't stay awake.     You have a headache that gets worse and does not go away.     You have new vision changes or one pupil (the black part in the middle of the eye) that is larger than the other.     You have slurred speech, balance problems, or decreased coordination.   Call your doctor now or seek immediate medical care if:    You have new or worse vomiting.     You feel less alert.     You have new weakness or numbness in any part of your body.     You have new symptoms, such as unclear thinking or changes in mood.   Watch closely for changes in your health, and be sure to contact your doctor if:    You do not get better as expected.   Where can you learn more?  Go to https://www.[employer].net/patientEd and enter Z711 to learn more about \"Concussion: Care Instructions.\"  Current as of: December 20, 2023               Content Version: 14.0  © 5238-8780 [employer].   Care instructions adapted under license by [employer]. If you have questions about a medical condition or this instruction, always ask your healthcare professional. [employer] disclaims any warranty or liability for your use of this information.

## 2024-04-04 NOTE — PROGRESS NOTES
Yanna Curtis, was evaluated through a synchronous (real-time) audio-video encounter. The patient (or guardian if applicable) is aware that this is a billable service, which includes applicable co-pays. This Virtual Visit was conducted with patient's (and/or legal guardian's) consent. Patient identification was verified, and a caregiver was present when appropriate.   The patient was located at Home: [address] [place] [id number]  Provider was located at Facility (Appt Dept): [address] [place] [id number]  Confirm you are appropriately licensed, registered, or certified to deliver care in the state where the patient is located as indicated above. If you are not or unsure, please re-schedule the visit: Yes, I confirm.     Yanna Curtis (:  2005) is a Established patient, presenting virtually for evaluation of the following:    Assessment & Plan   Below is the assessment and plan developed based on review of pertinent history, physical exam, labs, studies, and medications.  1. Concussion without loss of consciousness, initial encounter  -     External Referral To Psychiatry  2. Neck muscle spasm  3. Anxiety and depression  -     External Referral To Psychiatry  4. ADHD, predominantly inattentive type  -     External Referral To Psychiatry  5. Body mass index (BMI) 50.0-59.9, adult (HCC)    Return in about 4 weeks (around 2024) for Concussion.       Subjective   she is a 19 y.o. year old female who presents for follow up of concussion.  Update:.  Since our last visit 4 weeks ago patient states that he had a lip drop in school due to the decline in her depression.  Denies any current suicidal thoughts and is in intensive therapy at [facility]. Currently, She does not have a psychiatrist on board.  In regards to her congestion she is in physical therapy and states that both her neck pain and her concussion symptoms are doing well they are concentrating on her vision and balance.  In

## 2024-04-23 DIAGNOSIS — F90.0 ADHD, PREDOMINANTLY INATTENTIVE TYPE: ICD-10-CM

## 2024-04-23 NOTE — TELEPHONE ENCOUNTER
PCP: Ellen Nunez APRN - NP    Last appt: 3/4/2024   Future Appointments   Date Time Provider Department Center   5/2/2024  2:00 PM Maxwell Tovar MD [facility] MAIN BS AMB   5/9/2024  1:30 PM Ellen Nunez APRN - NP [facility] BS AMB       Requested Prescriptions     Pending Prescriptions Disp Refills    methylphenidate (METADATE ER) 10 MG extended release tablet 30 tablet 0     Sig: Take 1 tablet by mouth daily for 30 days. Max Daily Amount: 10 mg         Prior labs and Blood pressures:  BP Readings from Last 3 Encounters:   03/07/24 107/71   03/04/24 (!) 140/92   01/29/24 118/67     Lab Results   Component Value Date/Time     03/16/2023 10:24 AM    K 4.2 03/16/2023 10:24 AM     03/16/2023 10:24 AM    CO2 27 03/16/2023 10:24 AM    BUN 9 03/16/2023 10:24 AM    GFRAA >60 06/15/2021 02:49 PM     No results found for: \"HBA1C\", \"[id number]\"  Lab Results   Component Value Date/Time    CHOL 160 03/16/2023 10:24 AM    HDL 36 03/16/2023 10:24 AM     No results found for: \"VITD3\", \"VD3RIA\"    Lab Results   Component Value Date/Time    TSH 3.04 03/16/2023 10:24 AM

## 2024-04-25 RX ORDER — METHYLPHENIDATE HYDROCHLORIDE EXTENDED RELEASE 10 MG/1
10 TABLET ORAL DAILY
Qty: 30 TABLET | Refills: 0 | Status: SHIPPED | OUTPATIENT
Start: 2024-04-25 | End: 2024-05-25

## 2024-05-02 ENCOUNTER — OFFICE VISIT (OUTPATIENT)
Facility: CLINIC | Age: 19
End: 2024-05-02
Payer: COMMERCIAL

## 2024-05-02 VITALS
RESPIRATION RATE: 18 BRPM | SYSTOLIC BLOOD PRESSURE: 106 MMHG | WEIGHT: 293 LBS | HEART RATE: 65 BPM | DIASTOLIC BLOOD PRESSURE: 72 MMHG | HEIGHT: 63 IN | BODY MASS INDEX: 51.91 KG/M2 | OXYGEN SATURATION: 98 % | TEMPERATURE: 97.3 F

## 2024-05-02 DIAGNOSIS — F32.A ANXIETY AND DEPRESSION: ICD-10-CM

## 2024-05-02 DIAGNOSIS — S06.0X0A CONCUSSION WITHOUT LOSS OF CONSCIOUSNESS, INITIAL ENCOUNTER: Primary | ICD-10-CM

## 2024-05-02 DIAGNOSIS — F41.9 ANXIETY AND DEPRESSION: ICD-10-CM

## 2024-05-02 DIAGNOSIS — M62.838 NECK MUSCLE SPASM: ICD-10-CM

## 2024-05-02 PROCEDURE — 99215 OFFICE O/P EST HI 40 MIN: CPT | Performed by: FAMILY MEDICINE

## 2024-05-02 RX ORDER — TRAZODONE HYDROCHLORIDE 50 MG/1
50 TABLET ORAL NIGHTLY
COMMUNITY
Start: 2024-04-25

## 2024-05-02 RX ORDER — RISPERIDONE 0.25 MG/1
0.25 TABLET ORAL 2 TIMES DAILY
COMMUNITY

## 2024-05-02 NOTE — PROGRESS NOTES
21/77 5/4/24 0       Comprehensive evaluation, administration and interpretation of SCAT3/Impact Neuro Psych testing completed at office visit today.     Results scanned into chart.    1. Brief discussion with  10 minutes   2. Interview & brief neurological   and balance exam with athlete 40 minutes  3. Brief interview with parent (if a minor) 15 minutes   4. ImPACT testing (patient alone) 30 minutes  5. Review results and discuss concussion   and return to play with athlete and parent 20 minutes  6. Brief report (dictated) 20 minutes  7. Feedback with ATC next day 15 minutes  8. Feedback with parent two days later 15 minutes    Spent 60min face-to-face, >50% spent on counseling & patient education.  Assessment/ Plan:   1. Concussion without loss of consciousness, initial encounter  Concussion is resolved, follow-up as needed  2. Neck muscle spasm  Continue with home exercise program  3. Anxiety and depression  Continue with psychiatry       Return if symptoms worsen or fail to improve.    1. Rest.  No sports or exercise until cleared.   2. Concussions typically results in the rapid onset of short-lived impairment that resolves spontaneously over time (usually within 1 week - 1 month).    I have discussed the diagnosis with the patient and the intended plan as seen in the above orders.  The patient has received an after-visit summary and questions were answered concerning future plans.     Medication Side Effects and Warnings were discussed with patient,  Patient Labs were reviewed and or requested, and  Patient Past Records were reviewed and or requested  Yes       Pt agrees to call or return to clinic and/or go to closest ER with any worsening of symptoms.  This may include, but not limited to increased fever (>100.4) with NSAIDS or Tylenol, increased edema, confusion, rash, worsening of presenting symptoms.    Please note that this dictation was completed with [employer], the computer voice recognition

## 2024-05-06 RX ORDER — ESCITALOPRAM OXALATE 20 MG/1
20 TABLET ORAL DAILY
Qty: 90 TABLET | Refills: 1 | Status: SHIPPED | OUTPATIENT
Start: 2024-05-06

## 2024-05-09 ENCOUNTER — OFFICE VISIT (OUTPATIENT)
Age: 19
End: 2024-05-09
Payer: COMMERCIAL

## 2024-05-09 VITALS
HEIGHT: 63 IN | TEMPERATURE: 98.5 F | DIASTOLIC BLOOD PRESSURE: 72 MMHG | RESPIRATION RATE: 16 BRPM | HEART RATE: 81 BPM | WEIGHT: 293 LBS | SYSTOLIC BLOOD PRESSURE: 111 MMHG | BODY MASS INDEX: 51.91 KG/M2 | OXYGEN SATURATION: 98 %

## 2024-05-09 DIAGNOSIS — R07.81 RIB PAIN ON LEFT SIDE: Primary | ICD-10-CM

## 2024-05-09 PROCEDURE — 99213 OFFICE O/P EST LOW 20 MIN: CPT | Performed by: NURSE PRACTITIONER

## 2024-05-09 RX ORDER — BUSPIRONE HYDROCHLORIDE 10 MG/1
TABLET ORAL
COMMUNITY
Start: 2024-05-08

## 2024-05-09 NOTE — PROGRESS NOTES
Chief Complaint   Patient presents with    Follow-up     \"Have you been to the ER, urgent care clinic since your last visit?  Hospitalized since your last visit?\"    YES - When: approximately 6 days ago.  Where and Why: [facility].    “Have you seen or consulted any other health care providers outside of [facility] since your last visit?”    NO

## 2024-08-09 ENCOUNTER — OFFICE VISIT (OUTPATIENT)
Age: 19
End: 2024-08-09
Payer: COMMERCIAL

## 2024-08-09 VITALS
WEIGHT: 293 LBS | HEART RATE: 73 BPM | DIASTOLIC BLOOD PRESSURE: 70 MMHG | RESPIRATION RATE: 16 BRPM | HEIGHT: 63 IN | TEMPERATURE: 98.4 F | BODY MASS INDEX: 51.91 KG/M2 | SYSTOLIC BLOOD PRESSURE: 109 MMHG | OXYGEN SATURATION: 97 %

## 2024-08-09 DIAGNOSIS — Z13.220 SCREENING, LIPID: ICD-10-CM

## 2024-08-09 DIAGNOSIS — Z13.1 SCREENING FOR DIABETES MELLITUS: ICD-10-CM

## 2024-08-09 DIAGNOSIS — E66.01 MORBID OBESITY (HCC): ICD-10-CM

## 2024-08-09 DIAGNOSIS — Z00.00 ROUTINE GENERAL MEDICAL EXAMINATION AT A HEALTH CARE FACILITY: Primary | ICD-10-CM

## 2024-08-09 LAB
ALBUMIN SERPL-MCNC: 3.9 G/DL (ref 3.5–5)
ALBUMIN/GLOB SERPL: 1.1 (ref 1.1–2.2)
ALP SERPL-CCNC: 98 U/L (ref 45–117)
ALT SERPL-CCNC: 26 U/L (ref 12–78)
ANION GAP SERPL CALC-SCNC: 3 MMOL/L (ref 5–15)
AST SERPL-CCNC: 10 U/L (ref 15–37)
BASOPHILS # BLD: 0 K/UL (ref 0–0.1)
BASOPHILS NFR BLD: 1 % (ref 0–1)
BILIRUB SERPL-MCNC: 0.5 MG/DL (ref 0.2–1)
BUN SERPL-MCNC: 10 MG/DL (ref 6–20)
BUN/CREAT SERPL: 16 (ref 12–20)
CALCIUM SERPL-MCNC: 9.3 MG/DL (ref 8.5–10.1)
CHLORIDE SERPL-SCNC: 108 MMOL/L (ref 97–108)
CHOLEST SERPL-MCNC: 167 MG/DL
CO2 SERPL-SCNC: 26 MMOL/L (ref 21–32)
CREAT SERPL-MCNC: 0.63 MG/DL (ref 0.55–1.02)
DIFFERENTIAL METHOD BLD: ABNORMAL
EOSINOPHIL # BLD: 0.1 K/UL (ref 0–0.4)
EOSINOPHIL NFR BLD: 1 % (ref 0–7)
ERYTHROCYTE [DISTWIDTH] IN BLOOD BY AUTOMATED COUNT: 15.2 % (ref 11.5–14.5)
EST. AVERAGE GLUCOSE BLD GHB EST-MCNC: 105 MG/DL
GLOBULIN SER CALC-MCNC: 3.7 G/DL (ref 2–4)
GLUCOSE SERPL-MCNC: 111 MG/DL (ref 65–100)
HBA1C MFR BLD: 5.3 % (ref 4–5.6)
HCT VFR BLD AUTO: 37.7 % (ref 35–47)
HDLC SERPL-MCNC: 36 MG/DL
HDLC SERPL: 4.6 (ref 0–5)
HGB BLD-MCNC: 11.9 G/DL (ref 11.5–16)
IMM GRANULOCYTES # BLD AUTO: 0 K/UL (ref 0–0.04)
IMM GRANULOCYTES NFR BLD AUTO: 0 % (ref 0–0.5)
LDLC SERPL CALC-MCNC: 106.6 MG/DL (ref 0–100)
LYMPHOCYTES # BLD: 2.1 K/UL (ref 0.8–3.5)
LYMPHOCYTES NFR BLD: 28 % (ref 12–49)
MCH RBC QN AUTO: 24.6 PG (ref 26–34)
MCHC RBC AUTO-ENTMCNC: 31.6 G/DL (ref 30–36.5)
MCV RBC AUTO: 78.1 FL (ref 80–99)
MONOCYTES # BLD: 0.5 K/UL (ref 0–1)
MONOCYTES NFR BLD: 7 % (ref 5–13)
NEUTS SEG # BLD: 4.8 K/UL (ref 1.8–8)
NEUTS SEG NFR BLD: 63 % (ref 32–75)
NRBC # BLD: 0 K/UL (ref 0–0.01)
NRBC BLD-RTO: 0 PER 100 WBC
PLATELET # BLD AUTO: 426 K/UL (ref 150–400)
PMV BLD AUTO: 9.1 FL (ref 8.9–12.9)
POTASSIUM SERPL-SCNC: 4 MMOL/L (ref 3.5–5.1)
PROT SERPL-MCNC: 7.6 G/DL (ref 6.4–8.2)
RBC # BLD AUTO: 4.83 M/UL (ref 3.8–5.2)
SODIUM SERPL-SCNC: 137 MMOL/L (ref 136–145)
TRIGL SERPL-MCNC: 122 MG/DL
TSH SERPL DL<=0.05 MIU/L-ACNC: 1.9 UIU/ML (ref 0.36–3.74)
VLDLC SERPL CALC-MCNC: 24.4 MG/DL
WBC # BLD AUTO: 7.5 K/UL (ref 3.6–11)

## 2024-08-09 PROCEDURE — 99395 PREV VISIT EST AGE 18-39: CPT | Performed by: NURSE PRACTITIONER

## 2024-08-09 RX ORDER — LISDEXAMFETAMINE DIMESYLATE 30 MG/1
30 CAPSULE ORAL EVERY MORNING
COMMUNITY
Start: 2024-07-03

## 2024-08-09 SDOH — ECONOMIC STABILITY: FOOD INSECURITY: WITHIN THE PAST 12 MONTHS, YOU WORRIED THAT YOUR FOOD WOULD RUN OUT BEFORE YOU GOT MONEY TO BUY MORE.: NEVER TRUE

## 2024-08-09 SDOH — ECONOMIC STABILITY: FOOD INSECURITY: WITHIN THE PAST 12 MONTHS, THE FOOD YOU BOUGHT JUST DIDN'T LAST AND YOU DIDN'T HAVE MONEY TO GET MORE.: NEVER TRUE

## 2024-08-09 SDOH — ECONOMIC STABILITY: HOUSING INSECURITY
IN THE LAST 12 MONTHS, WAS THERE A TIME WHEN YOU DID NOT HAVE A STEADY PLACE TO SLEEP OR SLEPT IN A SHELTER (INCLUDING NOW)?: NO

## 2024-08-09 SDOH — ECONOMIC STABILITY: INCOME INSECURITY: HOW HARD IS IT FOR YOU TO PAY FOR THE VERY BASICS LIKE FOOD, HOUSING, MEDICAL CARE, AND HEATING?: NOT HARD AT ALL

## 2024-08-09 NOTE — PROGRESS NOTES
Assessment/ Plan:   Full age appropriate History and Physical exam as well as health care maintenance  performed and discussed today.  Risk factor modification discussed today includes safe sex practices, healthy diet and exercise, and seat belt use. Continue current medications.    1. Routine general medical examination at a health care facility  -     CBC with Auto Differential; Future  -     Comprehensive Metabolic Panel; Future  -     TSH; Future  2. Screening, lipid  -     Lipid Panel; Future  3. Screening for diabetes mellitus  -     Hemoglobin A1C; Future  4. Morbid obesity (HCC)   Worsening.  She has failed to improve her weight through positive lifestyle modification and routine aerobic exercise.  She will check into benefits through her insurance.      Side effects and risks discussed of initiating GLP therapy.  Has failed conservative therapy including a calorie deficit diet, routine aerobic exercise for greater than 6 months.  Is not on an concomitant GLP therapy and is not taking any other anti-obesity treatment at this time. Criteria met includes BMI >30.       Return in about 3 months (around 11/9/2024) for Follow Up.     Discussed expected course/resolution/complications of diagnosis in detail with patient.    Medication risks/benefits/costs/interactions/alternatives discussed with patient.    Pt was given after visit summary which includes diagnoses, current medications & vitals.   Pt expressed understanding with the diagnosis and plan      HPI:   Yanna Curtis is a 19 y.o. female who presents for annual exam.    She is interested in weight loss.  She has attempted self directed dieting and routine aerobic exercise without significant improvement.  She is unsure of weight loss benefits through her insurance.  She is going back to school soon.     Current Outpatient Medications   Medication Sig Dispense Refill    Multiple Vitamins-Minerals (CENTRUM ULTRA WOMENS PO) Take by mouth

## 2024-08-09 NOTE — PROGRESS NOTES
Chief Complaint   Patient presents with    Annual Exam     \"Have you been to the ER, urgent care clinic since your last visit?  Hospitalized since your last visit?\"    NO    “Have you seen or consulted any other health care providers outside of [facility] since your last visit?”    NO

## 2024-08-23 ENCOUNTER — CLINICAL DOCUMENTATION (OUTPATIENT)
Facility: HOSPITAL | Age: 19
End: 2024-08-23

## 2024-08-23 NOTE — PROGRESS NOTES
[facility] at [facility]  Specialty Pharmacy Update    Date: 08/23/24    Yanna Curtis 2005     Medication: Wegovy 0.25 mg/0.5 ml    Prior Authorization: through 3/6/2025    Copay: $30 (will be $25 with copay card)    Pharmacy will contact patient.    Corinne McEwen, MAHSA  [facility] at [address], [facility] [address]   [place] [address]  phone: [phone]   fax: [phone]

## 2024-09-17 DIAGNOSIS — E66.01 MORBID OBESITY: ICD-10-CM

## 2024-09-17 RX ORDER — SEMAGLUTIDE 0.25 MG/.5ML
0.25 INJECTION, SOLUTION SUBCUTANEOUS
Qty: 2 ML | Refills: 0 | Status: CANCELLED | OUTPATIENT
Start: 2024-09-17

## 2024-09-17 NOTE — TELEPHONE ENCOUNTER
PCP: Ellen Nunez APRN - NP    Last appt: 8/9/2024   No future appointments.    Requested Prescriptions     Pending Prescriptions Disp Refills    Semaglutide-Weight Management (WEGOVY) 0.25 MG/0.5ML SOAJ SC injection 2 mL 0     Sig: Inject 0.25 mg into the skin every 7 days         Prior labs and Blood pressures:  BP Readings from Last 3 Encounters:   08/09/24 109/70   05/09/24 111/72   05/02/24 106/72     Lab Results   Component Value Date/Time     08/09/2024 11:49 AM    K 4.0 08/09/2024 11:49 AM     08/09/2024 11:49 AM    CO2 26 08/09/2024 11:49 AM    BUN 10 08/09/2024 11:49 AM    GFRAA >60 06/15/2021 02:49 PM     No results found for: \"HBA1C\", \"[id number]\"  Lab Results   Component Value Date/Time    CHOL 167 08/09/2024 11:49 AM    HDL 36 08/09/2024 11:49 AM    .6 08/09/2024 11:49 AM    LDL 86.4 03/16/2023 10:24 AM    VLDL 24.4 08/09/2024 11:49 AM     No results found for: \"VITD3\"    Lab Results   Component Value Date/Time    TSH 1.90 08/09/2024 11:49 AM

## 2024-09-18 NOTE — TELEPHONE ENCOUNTER
LVM for patient.  Waiting for return call.  Thanks, omar CHRISTIE again today, 9/20/24 to check on dose increase.

## 2024-09-20 DIAGNOSIS — E66.01 MORBID OBESITY: ICD-10-CM

## 2024-09-20 RX ORDER — SEMAGLUTIDE 0.25 MG/.5ML
0.5 INJECTION, SOLUTION SUBCUTANEOUS
Qty: 2 ML | Refills: 0 | Status: SHIPPED | OUTPATIENT
Start: 2024-09-20

## 2024-10-07 ENCOUNTER — TELEPHONE (OUTPATIENT)
Age: 19
End: 2024-10-07

## 2024-10-07 NOTE — TELEPHONE ENCOUNTER
Left a voice mail for the patient to give us a call or send a [employer] message to let us know if she is ready to increase her Wegovy to 0.5 per [place].

## 2024-11-04 NOTE — TELEPHONE ENCOUNTER
PCP: Ellen Nunez, APRN - NP    Last appt: 8/9/2024   No future appointments.    Requested Prescriptions     Pending Prescriptions Disp Refills    escitalopram (LEXAPRO) 20 MG tablet [Pharmacy Med Name: ESCITALOPRAM 20MG TABLETS] 30 tablet 0     Sig: TAKE 1 TABLET BY MOUTH DAILY         Prior labs and Blood pressures:  BP Readings from Last 3 Encounters:   08/09/24 109/70   05/09/24 111/72   05/02/24 106/72     Lab Results   Component Value Date/Time     08/09/2024 11:49 AM    K 4.0 08/09/2024 11:49 AM     08/09/2024 11:49 AM    CO2 26 08/09/2024 11:49 AM    BUN 10 08/09/2024 11:49 AM    GFRAA >60 06/15/2021 02:49 PM     No results found for: \"HBA1C\", \"[id number]\"  Lab Results   Component Value Date/Time    CHOL 167 08/09/2024 11:49 AM    HDL 36 08/09/2024 11:49 AM    .6 08/09/2024 11:49 AM    LDL 86.4 03/16/2023 10:24 AM    VLDL 24.4 08/09/2024 11:49 AM     No results found for: \"VITD3\"    Lab Results   Component Value Date/Time    TSH 1.90 08/09/2024 11:49 AM

## 2024-11-05 RX ORDER — ESCITALOPRAM OXALATE 20 MG/1
20 TABLET ORAL DAILY
Qty: 30 TABLET | Refills: 0 | Status: SHIPPED | OUTPATIENT
Start: 2024-11-05

## 2024-11-26 ENCOUNTER — OFFICE VISIT (OUTPATIENT)
Facility: CLINIC | Age: 19
End: 2024-11-26
Payer: COMMERCIAL

## 2024-11-26 VITALS
HEART RATE: 88 BPM | BODY MASS INDEX: 51.91 KG/M2 | WEIGHT: 293 LBS | OXYGEN SATURATION: 97 % | SYSTOLIC BLOOD PRESSURE: 96 MMHG | RESPIRATION RATE: 16 BRPM | DIASTOLIC BLOOD PRESSURE: 71 MMHG | HEIGHT: 63 IN | TEMPERATURE: 97.8 F

## 2024-11-26 DIAGNOSIS — R26.9 GAIT ABNORMALITY: ICD-10-CM

## 2024-11-26 DIAGNOSIS — F41.8 ANXIETY WITH DEPRESSION: ICD-10-CM

## 2024-11-26 DIAGNOSIS — S06.0X0A CONCUSSION WITHOUT LOSS OF CONSCIOUSNESS, INITIAL ENCOUNTER: Primary | ICD-10-CM

## 2024-11-26 DIAGNOSIS — F90.9 ATTENTION DEFICIT HYPERACTIVITY DISORDER (ADHD), UNSPECIFIED ADHD TYPE: ICD-10-CM

## 2024-11-26 PROCEDURE — 99215 OFFICE O/P EST HI 40 MIN: CPT | Performed by: FAMILY MEDICINE

## 2024-11-26 NOTE — PROGRESS NOTES
plans.     Medication Side Effects and Warnings were discussed with patient,  Patient Labs were reviewed and or requested, and  Patient Past Records were reviewed and or requested  Yes       Pt agrees to call or return to clinic and/or go to closest ER with any worsening of symptoms.  This may include, but not limited to increased fever (>100.4) with NSAIDS or Tylenol, increased edema, confusion, rash, worsening of presenting symptoms.    Please note that this dictation was completed with [employer], the computer voice recognition software.  Quite often unanticipated grammatical, syntax, homophones, and other interpretive errors are inadvertently transcribed by the computer software.  Please disregard these errors.  Please excuse any errors that have escaped final proofreading.  Thank you.

## 2024-11-27 ENCOUNTER — TELEPHONE (OUTPATIENT)
Age: 19
End: 2024-11-27

## 2024-12-03 ENCOUNTER — TELEPHONE (OUTPATIENT)
Age: 19
End: 2024-12-03

## 2024-12-03 NOTE — TELEPHONE ENCOUNTER
Pt called about having symptom of dizziness,loss of memory, and bad vertigo I offered pt a soon appt but pt refused a sooner appt pt states she can't get seen until December 16 do to school. I did triage it to nurse Katelynn rosenbaum for pt to go to [facility].      BCBN [phone]

## 2024-12-05 NOTE — TELEPHONE ENCOUNTER
Tried calling patient to get more information and scheduling her sooner, but the number that the PSR put is a [employer] meeting number.   Called number in patient chart, no answer. LVMTROC.

## 2024-12-11 ENCOUNTER — TELEPHONE (OUTPATIENT)
Age: 19
End: 2024-12-11

## 2024-12-11 NOTE — TELEPHONE ENCOUNTER
Left VM for pt to narciso appt she has 12/16 at 3:30 pm due to AINSLEY Nunez out of office this day in the afternoon-TM 12/11/24

## 2024-12-12 ENCOUNTER — TELEPHONE (OUTPATIENT)
Age: 19
End: 2024-12-12

## 2024-12-12 NOTE — TELEPHONE ENCOUNTER
Left VM for pt to narciso appt she has 12/16/24 at 3:30 pm due to AINSLEY Nunez out of office after 12 pm.-TM 12/12/24.

## 2024-12-26 ENCOUNTER — OFFICE VISIT (OUTPATIENT)
Age: 19
End: 2024-12-26
Payer: COMMERCIAL

## 2024-12-26 VITALS
WEIGHT: 293 LBS | HEIGHT: 62 IN | BODY MASS INDEX: 53.92 KG/M2 | HEART RATE: 78 BPM | RESPIRATION RATE: 12 BRPM | DIASTOLIC BLOOD PRESSURE: 68 MMHG | OXYGEN SATURATION: 98 % | TEMPERATURE: 98.2 F | SYSTOLIC BLOOD PRESSURE: 108 MMHG

## 2024-12-26 DIAGNOSIS — E66.01 MORBID OBESITY: ICD-10-CM

## 2024-12-26 DIAGNOSIS — R42 VERTIGO: ICD-10-CM

## 2024-12-26 DIAGNOSIS — R00.2 PALPITATIONS: Primary | ICD-10-CM

## 2024-12-26 DIAGNOSIS — R07.89 ATYPICAL CHEST PAIN: ICD-10-CM

## 2024-12-26 PROCEDURE — 99213 OFFICE O/P EST LOW 20 MIN: CPT | Performed by: NURSE PRACTITIONER

## 2024-12-26 RX ORDER — DEXTROAMPHETAMINE SACCHARATE, AMPHETAMINE ASPARTATE, DEXTROAMPHETAMINE SULFATE AND AMPHETAMINE SULFATE 1.25; 1.25; 1.25; 1.25 MG/1; MG/1; MG/1; MG/1
5 TABLET ORAL AS NEEDED
COMMUNITY
Start: 2024-10-04

## 2024-12-26 NOTE — PROGRESS NOTES
Assessment/Plan:     1. Palpitations  -     [facility] Isauro Perdue MD, CardiologyBobby ([address])  2. Atypical chest pain  -     [facility] - Isauro Diaz MD, CardiologyBobby ([address])  3. Morbid obesity  -     [facility] - Isauro Diaz MD, CardiologyBobby ([address])  4. Vertigo  -     [facility] - Isauro Diaz MD, CardiologyBobby ([address])     Unclear etiology.  Will rule out cardiac differentials.  Follow-up on an as-needed basis.  ER if symptoms worsen      Return if symptoms worsen or fail to improve.     Discussed expected course/resolution/complications of diagnosis in detail with patient.    Medication risks/benefits/costs/interactions/alternatives discussed with patient.    Pt was given after visit summary which includes diagnoses, current medications & vitals.   Pt expressed understanding with the diagnosis and plan          Subjective:      Yanna Curtis is a 19 y.o. female who presents for had concerns including Dizziness (Random aggressive dizzy spells/lose of memory bad vertigo//Orthostatic bp taken - pt felt light headed during).     Reports a 2-month history of a multitude of symptoms including near syncope, lightheadedness, midsternal chest pain, heaviness in the chest, palpitations.  Symptoms occur with activity and with rest.    Patient Active Problem List   Diagnosis    ADHD, predominantly inattentive type       Current Outpatient Medications   Medication Sig Dispense Refill    amphetamine-dextroamphetamine (ADDERALL) 5 MG tablet Take 1 tablet by mouth as needed.      escitalopram (LEXAPRO) 20 MG tablet TAKE 1 TABLET BY MOUTH DAILY 30 tablet 0    Multiple Vitamins-Minerals (CENTRUM ULTRA WOMENS PO) Take by mouth      lisdexamfetamine (VYVANSE) 30 MG capsule Take 1 capsule by mouth every morning.      diclofenac (VOLTAREN) 75 MG EC tablet Take 1 tablet by mouth daily as needed for Pain 30 tablet 1    busPIRone (BUSPAR) 10 MG tablet       risperiDONE (RISPERDAL) 0.25 MG tablet Take 1 tablet

## 2024-12-26 NOTE — PROGRESS NOTES
Chief Complaint   Patient presents with    Dizziness     lose of memory bad vertigo           \"Have you been to the ER, urgent care clinic since your last visit?  Hospitalized since your last visit?\"    No    “Have you seen or consulted any other health care providers outside of [facility] since your last visit?”    Dr. Méndez - Sport Medicine - Concussion            Click Here for Release of Records Request           4/4/2024    11:51 AM   PHQ-9    Little interest or pleasure in doing things 0   Feeling down, depressed, or hopeless 0   PHQ-2 Score 0   PHQ-9 Total Score 0           Financial Resource Strain: Low Risk  (8/9/2024)    Overall Financial Resource Strain (CARDIA)     Difficulty of Paying Living Expenses: Not hard at all      Food Insecurity: No Food Insecurity (8/9/2024)    Hunger Vital Sign     Worried About Running Out of Food in the Last Year: Never true     Ran Out of Food in the Last Year: Never true          Health Maintenance Due   Topic Date Due    COVID-19 Vaccine (2 - 2023-24 season) 09/01/2024

## 2024-12-27 ENCOUNTER — TELEPHONE (OUTPATIENT)
Age: 19
End: 2024-12-27

## 2024-12-27 ASSESSMENT — ENCOUNTER SYMPTOMS: SHORTNESS OF BREATH: 0

## 2024-12-27 NOTE — TELEPHONE ENCOUNTER
Telephone call made to patient. Two patient identifiers verified.   Rescheduled for sooner.   Future Appointments   Date Time Provider Department Center   1/13/2025  8:00 AM Isauro Diaz MD CAVREY BS AMB   1/14/2025  1:00 PM Anam Griffith MD NEUMRSPBPBB BS AMB   1/27/2025 12:00 PM Vadim Valiente, PhD NCMRNEU BS AMB   3/28/2025  8:00 AM PSYCH TESTING [facility] NCMARILINU BS AMB   4/11/2025  9:00 AM Vadim Valiente, PhD NCMRNEU BS AMB

## 2024-12-27 NOTE — TELEPHONE ENCOUNTER
----- Message from Dr. Isauro Diaz MD sent at 12/27/2024 10:54 AM EST -----  Hi Ellen!  Will work on getting her in. Happy holidays!    Donte  ----- Message -----  From: Ellen Nunez APRN - NP  Sent: 12/26/2024   3:59 PM EST  To: Isauro Diaz MD    Can you accommodate this patient sooner rather than later?  She is a morbidly obese 19 year old female with atypical chest pain and palpitations with near syncope that likely will need a holter. Thanks!  Ellen

## 2025-01-13 ENCOUNTER — OFFICE VISIT (OUTPATIENT)
Age: 20
End: 2025-01-13
Payer: COMMERCIAL

## 2025-01-13 ENCOUNTER — TELEPHONE (OUTPATIENT)
Age: 20
End: 2025-01-13

## 2025-01-13 VITALS
WEIGHT: 293 LBS | BODY MASS INDEX: 53.92 KG/M2 | HEART RATE: 68 BPM | DIASTOLIC BLOOD PRESSURE: 68 MMHG | HEIGHT: 62 IN | OXYGEN SATURATION: 99 % | SYSTOLIC BLOOD PRESSURE: 120 MMHG

## 2025-01-13 DIAGNOSIS — R00.2 PALPITATIONS: Primary | ICD-10-CM

## 2025-01-13 DIAGNOSIS — R07.9 CHEST PAIN, UNSPECIFIED TYPE: ICD-10-CM

## 2025-01-13 PROCEDURE — 99204 OFFICE O/P NEW MOD 45 MIN: CPT | Performed by: INTERNAL MEDICINE

## 2025-01-13 PROCEDURE — 93000 ELECTROCARDIOGRAM COMPLETE: CPT | Performed by: INTERNAL MEDICINE

## 2025-01-13 ASSESSMENT — PATIENT HEALTH QUESTIONNAIRE - PHQ9
SUM OF ALL RESPONSES TO PHQ QUESTIONS 1-9: 0
SUM OF ALL RESPONSES TO PHQ QUESTIONS 1-9: 0
1. LITTLE INTEREST OR PLEASURE IN DOING THINGS: NOT AT ALL
SUM OF ALL RESPONSES TO PHQ QUESTIONS 1-9: 0
SUM OF ALL RESPONSES TO PHQ9 QUESTIONS 1 & 2: 0
2. FEELING DOWN, DEPRESSED OR HOPELESS: NOT AT ALL
SUM OF ALL RESPONSES TO PHQ QUESTIONS 1-9: 0

## 2025-01-13 NOTE — TELEPHONE ENCOUNTER
Enrolled with [employer] - Ordered and being shipped to patient's home address on file.  ETA within 5-7 business days.         Loop  Received: Today  Sylvia Stewart, RN  Ayana Sinha  Please order a 1 week loops for palpitations with sensitive stickers per Dr. Diaz.    Thanks

## 2025-01-13 NOTE — PROGRESS NOTES
mouth as directed Nasal for 0 (Patient not taking: Reported on 12/26/2024)     No current facility-administered medications for this visit.       Isauro Diaz MD  [facility] [place]  [address], [address]  [place] [address]

## 2025-01-14 ENCOUNTER — OFFICE VISIT (OUTPATIENT)
Age: 20
End: 2025-01-14
Payer: COMMERCIAL

## 2025-01-14 VITALS
TEMPERATURE: 98 F | RESPIRATION RATE: 19 BRPM | DIASTOLIC BLOOD PRESSURE: 68 MMHG | SYSTOLIC BLOOD PRESSURE: 100 MMHG | WEIGHT: 293 LBS | OXYGEN SATURATION: 97 % | BODY MASS INDEX: 51.91 KG/M2 | HEIGHT: 63 IN | HEART RATE: 71 BPM

## 2025-01-14 DIAGNOSIS — R41.82 ACUTE ALTERATION IN MENTAL STATUS: ICD-10-CM

## 2025-01-14 DIAGNOSIS — R55 CONVULSIVE SYNCOPE: ICD-10-CM

## 2025-01-14 DIAGNOSIS — F07.81 POST CONCUSSION SYNDROME: Primary | ICD-10-CM

## 2025-01-14 DIAGNOSIS — R42 POST-CONCUSSION VERTIGO: ICD-10-CM

## 2025-01-14 DIAGNOSIS — G44.309 POST-CONCUSSION HEADACHE: ICD-10-CM

## 2025-01-14 DIAGNOSIS — F07.81 POST-CONCUSSION VERTIGO: ICD-10-CM

## 2025-01-14 DIAGNOSIS — E53.8 B12 DEFICIENCY: ICD-10-CM

## 2025-01-14 DIAGNOSIS — R41.3 DISTURBANCE OF MEMORY: ICD-10-CM

## 2025-01-14 DIAGNOSIS — F07.81 POST CONCUSSIVE SYNDROME: ICD-10-CM

## 2025-01-14 DIAGNOSIS — F90.0 ADHD, PREDOMINANTLY INATTENTIVE TYPE: ICD-10-CM

## 2025-01-14 PROCEDURE — 99205 OFFICE O/P NEW HI 60 MIN: CPT | Performed by: PSYCHIATRY & NEUROLOGY

## 2025-01-14 RX ORDER — ASPIRIN 81 MG/1
81 TABLET ORAL DAILY
COMMUNITY

## 2025-01-14 RX ORDER — UBIDECARENONE 75 MG
300 CAPSULE ORAL DAILY
COMMUNITY

## 2025-01-14 RX ORDER — BUTYROSPERMUM PARKII(SHEA BUTTER), SIMMONDSIA CHINENSIS (JOJOBA) SEED OIL, ALOE BARBADENSIS LEAF EXTRACT .01; 1; 3.5 G/100G; G/100G; G/100G
LIQUID TOPICAL
COMMUNITY

## 2025-01-14 ASSESSMENT — PATIENT HEALTH QUESTIONNAIRE - PHQ9
SUM OF ALL RESPONSES TO PHQ QUESTIONS 1-9: 0
SUM OF ALL RESPONSES TO PHQ QUESTIONS 1-9: 0
2. FEELING DOWN, DEPRESSED OR HOPELESS: NOT AT ALL
SUM OF ALL RESPONSES TO PHQ QUESTIONS 1-9: 0
SUM OF ALL RESPONSES TO PHQ9 QUESTIONS 1 & 2: 0
1. LITTLE INTEREST OR PLEASURE IN DOING THINGS: NOT AT ALL
SUM OF ALL RESPONSES TO PHQ QUESTIONS 1-9: 0

## 2025-01-15 LAB
FOLATE SERPL-MCNC: 21.6 NG/ML (ref 5–21)
VIT B12 SERPL-MCNC: 473 PG/ML (ref 193–986)

## 2025-01-15 NOTE — PROGRESS NOTES
History   Problem Relation Age of Onset    No Known Problems Sister     No Known Problems Sister     High Cholesterol Father     No Known Problems Mother     Anesth Problems Neg Hx       Social History     Tobacco Use    Smoking status: Never     Passive exposure: Never    Smokeless tobacco: Never   Substance Use Topics    Alcohol use: Never         Current Outpatient Medications:     Magnesium Citrate 100 MG CAPS, Take by mouth, Disp: , Rfl:     Coenzyme Q10 (CO Q-10) 300 MG CAPS, Take 300 mg by mouth daily, Disp: , Rfl:     aspirin 81 MG EC tablet, Take 1 tablet by mouth daily, Disp: , Rfl:     escitalopram (LEXAPRO) 20 MG tablet, TAKE 1 TABLET BY MOUTH DAILY, Disp: 30 tablet, Rfl: 0    Multiple Vitamins-Minerals (CENTRUM ULTRA WOMENS PO), Take by mouth, Disp: , Rfl:     lisdexamfetamine (VYVANSE) 30 MG capsule, Take 1 capsule by mouth every morning., Disp: , Rfl:     diclofenac (VOLTAREN) 75 MG EC tablet, Take 1 tablet by mouth daily as needed for Pain, Disp: 30 tablet, Rfl: 1    busPIRone (BUSPAR) 10 MG tablet, , Disp: , Rfl:     risperiDONE (RISPERDAL) 0.25 MG tablet, Take 1 tablet by mouth 2 times daily, Disp: , Rfl:     traZODone (DESYREL) 50 MG tablet, Take 1 tablet by mouth nightly, Disp: , Rfl:     dicyclomine (BENTYL) 10 MG capsule, TAKE ONE CAPSULE BY MOUTH EVERY 6 HOURS AS NEEDED FOR CRAMPS, Disp: , Rfl:     omeprazole (PRILOSEC) 20 MG delayed release capsule, Take 1 capsule by mouth daily, Disp: , Rfl:         Allergies   Allergen Reactions    Pollen Extract       MRI Results (most recent):  @[facility](CLV6702:1)@    @[facility](RNL8720:1)@  Review of Systems:  A comprehensive review of systems was negative except for: Musculoskeletal: positive for arthralgias, myalgias, and stiff joints  Neurological: positive for coordination problems, dizziness, gait problems, headaches, memory problems, and weakness  Behvioral/Psych: positive for ADHD, anxiety, and depression   Vitals:    01/14/25 1252

## 2025-01-16 ENCOUNTER — TELEPHONE (OUTPATIENT)
Age: 20
End: 2025-01-16

## 2025-01-16 DIAGNOSIS — R07.9 CHEST PAIN, UNSPECIFIED TYPE: Primary | ICD-10-CM

## 2025-01-16 NOTE — TELEPHONE ENCOUNTER
Telephone call made to patient. Two patient identifiers verified.   Went over results with patient. Verified understanding. All questions answered.     The patient is hesitant to have to pay for additional testing at this time. She is going to talk to her family and call back if she wants the lexiscan.

## 2025-01-16 NOTE — TELEPHONE ENCOUNTER
----- Message from Dr. Isauro Diaz MD sent at 1/15/2025  3:29 PM EST -----  Please let pt know stress test was inconclusive. She did not reach target HR. The EKGs were normal for the 4 minutes she was on the treadmill. Needs lexiscan nuc stress for further evaluation. Please set this up. thx

## 2025-01-27 ENCOUNTER — TELEMEDICINE (OUTPATIENT)
Age: 20
End: 2025-01-27
Payer: COMMERCIAL

## 2025-01-27 DIAGNOSIS — F41.0 PANIC ATTACKS: ICD-10-CM

## 2025-01-27 DIAGNOSIS — R45.89 DEPRESSED MOOD: ICD-10-CM

## 2025-01-27 DIAGNOSIS — Z91.49 HISTORY OF PSYCHOLOGICAL TRAUMA: ICD-10-CM

## 2025-01-27 DIAGNOSIS — R41.9 DEFICIT IN COMPREHENSION: ICD-10-CM

## 2025-01-27 DIAGNOSIS — F07.81 POST CONCUSSION SYNDROME: ICD-10-CM

## 2025-01-27 DIAGNOSIS — Z65.8 PSYCHOSOCIAL DISTRESS: ICD-10-CM

## 2025-01-27 DIAGNOSIS — R41.844 EXECUTIVE FUNCTION DEFICIT: ICD-10-CM

## 2025-01-27 DIAGNOSIS — R41.840 ATTENTION AND CONCENTRATION DEFICIT: ICD-10-CM

## 2025-01-27 DIAGNOSIS — R41.3 MEMORY LOSS: Primary | ICD-10-CM

## 2025-01-27 DIAGNOSIS — F41.9 ANXIETY: ICD-10-CM

## 2025-01-27 DIAGNOSIS — Z62.9 HISTORY OF ADVERSE CHILDHOOD EXPERIENCES: ICD-10-CM

## 2025-01-27 DIAGNOSIS — Z86.59 HISTORY OF ADHD: ICD-10-CM

## 2025-01-27 PROCEDURE — 90791 PSYCH DIAGNOSTIC EVALUATION: CPT | Performed by: PSYCHOLOGIST

## 2025-01-27 SDOH — HEALTH STABILITY - MENTAL HEALTH: PROBLEM RELATED TO UPBRINGING, UNSPECIFIED: Z62.9

## 2025-01-27 NOTE — PROGRESS NOTES
[facility]  [address] [facility], [facility], [address] [address]  Office:  [phone]  Fax: [phone]                  Initial Office Exam  Patient Name: Yanna Curtis  Age: 20 y.o.  Gender: female   Handedness: right handed   Presenting Concern: post concussive symptoms; anxiety and symptoms of depression  Primary Care Physician: Ellen Nunez APRN - NP  Referring Provider: Maxwell Tovar MD      REASON FOR REFERRAL:  This comprehensive and medically necessary neuropsychological assessment was requested to assist a differential diagnosis of cognitive and psychological concerns.  The use and purpose of this examination, as well as the extent and limitations of confidentiality, were explained prior to obtaining permission to participate.  Instructions were provided regarding the necessity to put forth optimal effort and answer questions truthfully in order to obtain reliable and accurate test results.    REVIEW OF RECORDS:  Ms. Curtis was referred by her PCP for a workup of post concussive cognitive symptoms (in January 2024, she fell and hit her head on the floor; no LOC; no vomiting; no retrograde or anterograde memory loss; no history of previous concussions). She has reported memory loss, dizziness, light sensitivity, and migraines.  Ms. Curtis is followed by a therapist and a psychiatrist.  She is prescribed Lexapro, BuSpar, Risperdal, trazodone, omeprazole, and Vyvanse.  A history of anxiety, depression, and ADHD are noted.    Her eye examination yielded normal results during a recent primary care exam.  However, her balance appeared compromised even with eyes open.  The evaluation suggested that symptoms may be more related to stress and anxiety as opposed to concussion.  That said, a referral to [facility] for balance therapy has been made.    Ms. Curtis was seen by neurology on 1/14/2025 presenting with concerns for progressive headaches, dizziness, loss of balance, and difficulty

## 2025-01-31 ENCOUNTER — TELEPHONE (OUTPATIENT)
Age: 20
End: 2025-01-31

## 2025-01-31 NOTE — TELEPHONE ENCOUNTER
----- Message from Dr. Isauro Diaz MD sent at 1/30/2025  9:42 PM EST -----  Please let pt know the heart monitor was normal. Symptoms wearing the monitor were not associated with any abnormal electrical finding. thx

## 2025-03-28 ENCOUNTER — PROCEDURE VISIT (OUTPATIENT)
Age: 20
End: 2025-03-28
Payer: COMMERCIAL

## 2025-03-28 DIAGNOSIS — F33.2 SEVERE EPISODE OF RECURRENT MAJOR DEPRESSIVE DISORDER, WITHOUT PSYCHOTIC FEATURES (HCC): ICD-10-CM

## 2025-03-28 DIAGNOSIS — F41.0 PANIC ATTACKS: ICD-10-CM

## 2025-03-28 DIAGNOSIS — F41.9 ANXIETY DISORDER, UNSPECIFIED TYPE: ICD-10-CM

## 2025-03-28 DIAGNOSIS — Z62.9 HISTORY OF ADVERSE CHILDHOOD EXPERIENCES: ICD-10-CM

## 2025-03-28 DIAGNOSIS — F90.2 ATTENTION DEFICIT HYPERACTIVITY DISORDER (ADHD), COMBINED TYPE: Primary | ICD-10-CM

## 2025-03-28 DIAGNOSIS — F43.10 PTSD (POST-TRAUMATIC STRESS DISORDER): ICD-10-CM

## 2025-03-28 DIAGNOSIS — Z65.8 PSYCHOSOCIAL DISTRESS: ICD-10-CM

## 2025-03-28 PROCEDURE — 96136 PSYCL/NRPSYC TST PHY/QHP 1ST: CPT | Performed by: PSYCHOLOGIST

## 2025-03-28 PROCEDURE — 96139 PSYCL/NRPSYC TST TECH EA: CPT | Performed by: PSYCHOLOGIST

## 2025-03-28 PROCEDURE — 96132 NRPSYC TST EVAL PHYS/QHP 1ST: CPT | Performed by: PSYCHOLOGIST

## 2025-03-28 PROCEDURE — 96133 NRPSYC TST EVAL PHYS/QHP EA: CPT | Performed by: PSYCHOLOGIST

## 2025-03-28 PROCEDURE — 96138 PSYCL/NRPSYC TECH 1ST: CPT | Performed by: PSYCHOLOGIST

## 2025-03-28 SDOH — HEALTH STABILITY - MENTAL HEALTH: PROBLEM RELATED TO UPBRINGING, UNSPECIFIED: Z62.9

## 2025-03-31 NOTE — PROGRESS NOTES
[facility]  [address] [facility], [facility], [address] [address]  Office:  [phone]  Fax: [phone]        Neuropsychological Evaluation Report    Patient Name: Yanna Curtis  Age: 20 y.o.  Gender: female   Handedness: right handed   Presenting Concern: post concussive symptoms; anxiety and symptoms of depression  Primary Care Physician: Ellen Nunez APRN - NP  Referring Provider: Maxwell Tovar MD    PATIENT HISTORY (OBTAINED DURING INITIAL CLINICAL EVALUATION):    REASON FOR REFERRAL:  This comprehensive and medically necessary neuropsychological assessment was requested to assist a differential diagnosis of cognitive and psychological concerns.  The use and purpose of this examination, as well as the extent and limitations of confidentiality, were explained prior to obtaining permission to participate.  Instructions were provided regarding the necessity to put forth optimal effort and answer questions truthfully in order to obtain reliable and accurate test results.    REVIEW OF RECORDS:  Ms. Curtis was referred by her PCP for a workup of post concussive cognitive symptoms (in January 2024, she fell and hit her head on the floor; no LOC; no vomiting; no retrograde or anterograde memory loss; no history of previous concussions). She has reported memory loss, dizziness, light sensitivity, and migraines.  Ms. Curtis is followed by a therapist and a psychiatrist.  She is prescribed Lexapro, BuSpar, Risperdal, trazodone, omeprazole, and Vyvanse.  A history of anxiety, depression, and ADHD are noted.    Her eye examination yielded normal results during a recent primary care exam.  However, her balance appeared compromised even with eyes open.  The evaluation suggested that symptoms may be more related to stress and anxiety as opposed to concussion.  That said, a referral to [facility] for balance therapy has been made.    Ms. Curtis was seen by neurology on 1/14/2025 presenting with concerns

## 2025-04-10 NOTE — PROGRESS NOTES
Interactive Psychotherapy/office feedback        Interactive office feedback session with Ms. Curtis.  I reviewed the results of the recent Neuropsychological Evaluation  including the observed areas of neurocognitive strengths and weaknesses. Education was provided regarding my diagnostic impressions, and treatment plan/options were discussed. I also answered numerous questions related to the clinical findings, including the various methods to improve cognition and mood. CBT, psychoeducation, and supportive psychotherapy techniques were utilized.     Patient's report placed in [employer] per patient request.     Ms. 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        Prior to seeing the patient I reviewed the records, including the previously completed report, the records in [facility], and any updated visits from other providers since I saw the patient last.       Diagnoses:      ADHD, combined type  Autism Spectrum Disorder, Level 1, provisional  Major Depressive Disorder, severe, without psychotic features  PTSD  Anxiety Disorder: R/O Generalized Anxiety Disorder R/O Somatization  Psychosocial Distress  Panic Attacks  History of Adverse Childhood Experiences        The patient will follow up with the referring provider, and reported being very pleased with the services provided.  Follow up with [facility] prn.         [address] psychotherapy 30 Minutes      This note was created using voice recognition software. Despite editing, there may be syntax errors.         Yanna BROWN Ever, was evaluated through a synchronous (real-time) audio-video encounter. The patient (or guardian if applicable) is aware that this is a billable service, which includes applicable co-pays. This Virtual Visit was conducted with patient's (and/or legal guardian's) consent. Patient identification was verified, and a caregiver was present when appropriate.   The patient was located at Home: [address] [place] [address]

## 2025-04-11 ENCOUNTER — TELEMEDICINE (OUTPATIENT)
Age: 20
End: 2025-04-11
Payer: COMMERCIAL

## 2025-04-11 DIAGNOSIS — F41.9 ANXIETY DISORDER, UNSPECIFIED TYPE: ICD-10-CM

## 2025-04-11 DIAGNOSIS — F33.2 SEVERE EPISODE OF RECURRENT MAJOR DEPRESSIVE DISORDER, WITHOUT PSYCHOTIC FEATURES (HCC): ICD-10-CM

## 2025-04-11 DIAGNOSIS — F90.2 ATTENTION DEFICIT HYPERACTIVITY DISORDER (ADHD), COMBINED TYPE: Primary | ICD-10-CM

## 2025-04-11 DIAGNOSIS — F41.0 PANIC ATTACKS: ICD-10-CM

## 2025-04-11 DIAGNOSIS — F43.10 PTSD (POST-TRAUMATIC STRESS DISORDER): ICD-10-CM

## 2025-04-11 DIAGNOSIS — Z62.9 HISTORY OF ADVERSE CHILDHOOD EXPERIENCES: ICD-10-CM

## 2025-04-11 DIAGNOSIS — Z65.8 PSYCHOSOCIAL DISTRESS: ICD-10-CM

## 2025-04-11 PROCEDURE — 90832 PSYTX W PT 30 MINUTES: CPT | Performed by: PSYCHOLOGIST

## 2025-04-11 SDOH — HEALTH STABILITY - MENTAL HEALTH: PROBLEM RELATED TO UPBRINGING, UNSPECIFIED: Z62.9

## 2025-05-13 NOTE — TELEPHONE ENCOUNTER
Amee Bianchi, PT, DPT                                                                                           ----- Message from Reba Jose M sent at 3/1/2022  3:42 PM EST -----  Subject: Message to Provider    QUESTIONS  Information for Provider? Patient is needing to schedule to have a urine   test this week because she is on spring break [facility] She was scheduled for 02/28/22 but her appointment was   cancelled. Please call patient to reschedule as soon as possible. Please   advise.  ---------------------------------------------------------------------------  --------------  CALL BACK INFO  What is the best way for the office to contact you? OK to leave message on   voicemail  Preferred Call Back Phone Number? [phone]  ---------------------------------------------------------------------------  --------------  SCRIPT ANSWERS  Relationship to Patient?  Self

## 2025-06-27 ENCOUNTER — OFFICE VISIT (OUTPATIENT)
Age: 20
End: 2025-06-27
Payer: COMMERCIAL

## 2025-06-27 VITALS
OXYGEN SATURATION: 97 % | HEART RATE: 87 BPM | SYSTOLIC BLOOD PRESSURE: 114 MMHG | DIASTOLIC BLOOD PRESSURE: 75 MMHG | BODY MASS INDEX: 53.92 KG/M2 | RESPIRATION RATE: 18 BRPM | HEIGHT: 62 IN | TEMPERATURE: 97.5 F | WEIGHT: 293 LBS

## 2025-06-27 DIAGNOSIS — T67.5XXD HEAT EXHAUSTION, SUBSEQUENT ENCOUNTER: Primary | ICD-10-CM

## 2025-06-27 DIAGNOSIS — R42 DIZZINESS: ICD-10-CM

## 2025-06-27 DIAGNOSIS — R25.2 MUSCLE CRAMPS: ICD-10-CM

## 2025-06-27 PROCEDURE — 99214 OFFICE O/P EST MOD 30 MIN: CPT

## 2025-06-27 SDOH — ECONOMIC STABILITY: FOOD INSECURITY: WITHIN THE PAST 12 MONTHS, THE FOOD YOU BOUGHT JUST DIDN'T LAST AND YOU DIDN'T HAVE MONEY TO GET MORE.: NEVER TRUE

## 2025-06-27 SDOH — ECONOMIC STABILITY: FOOD INSECURITY: WITHIN THE PAST 12 MONTHS, YOU WORRIED THAT YOUR FOOD WOULD RUN OUT BEFORE YOU GOT MONEY TO BUY MORE.: NEVER TRUE

## 2025-06-27 NOTE — PROGRESS NOTES
[facility]  [address] [id number]  Office [phone], Fax [phone]    Assessment/Plan:   Assessment:   20-year-old female with history of morbid obesity and ADHD presents today for follow-up for suspected heat exhaustion.  Assessment & Plan  1. Heat exhaustion/muscle cramps: Acute.  Ocurring after attending concert and 100 degree weather 5 days ago, now with residual symptoms of muscle cramps and fatigue.  - Symptoms: muscle cramps, hot and cold flashes, dizziness, lightheadedness, elevated heart rate.  - Normal reflexes and heart sounds.  Exam reassuring.  - Potential electrolyte imbalance, alternatively could represent medication side effect entheses multiple psychoactive medications) versus thyroid, versus less likely rhabdo.  - Treatment/evaluation: Rest. Hydrate with electrolyte-rich fluids (e.g., Pedialyte). Consume at least 64 oz of water daily. Stay indoors. Blood work to assess thyroid function, CK, electrolyte levels, and basic vitamins. Provide work note for today.    Follow-up  - Follow-up in 1-2 weeks if no improvement.       1. Heat exhaustion, subsequent encounter  2. Muscle cramps  -     TSH; Future  -     Iron and TIBC; Future  -     Basic Metabolic Panel; Future  -     CBC; Future  -     Magnesium; Future  -     Vitamin B12 & Folate; Future  -     CK; Future  3. Dizziness  -     TSH; Future  -     Iron and TIBC; Future  -     Basic Metabolic Panel; Future  -     CBC; Future  -     Magnesium; Future  -     Vitamin B12 & Folate; Future        Return if symptoms worsen or fail to improve.       Subjective:     Chief Complaint   Patient presents with    Heat Exposure     Pt was at an event which was hot and has heat exhaustion accompanied with hot and cold flashes.    Spasms     Pt complains of spasm in the body       HPI:    History of Present Illness  The patient presents for evaluation of heat exhaustion.    Heat Exhaustion and Related

## 2025-06-28 LAB
ANION GAP SERPL CALC-SCNC: 7 MMOL/L (ref 2–12)
BUN SERPL-MCNC: 7 MG/DL (ref 6–20)
BUN/CREAT SERPL: 10 (ref 12–20)
CALCIUM SERPL-MCNC: 9.6 MG/DL (ref 8.5–10.1)
CHLORIDE SERPL-SCNC: 106 MMOL/L (ref 97–108)
CK SERPL-CCNC: 69 U/L (ref 26–192)
CO2 SERPL-SCNC: 24 MMOL/L (ref 21–32)
CREAT SERPL-MCNC: 0.71 MG/DL (ref 0.55–1.02)
ERYTHROCYTE [DISTWIDTH] IN BLOOD BY AUTOMATED COUNT: 14 % (ref 11.5–14.5)
FOLATE SERPL-MCNC: 28.9 NG/ML (ref 5–21)
GLUCOSE SERPL-MCNC: 112 MG/DL (ref 65–100)
HCT VFR BLD AUTO: 41.5 % (ref 35–47)
HGB BLD-MCNC: 13.6 G/DL (ref 11.5–16)
IRON SATN MFR SERPL: 21 % (ref 20–50)
IRON SERPL-MCNC: 91 UG/DL (ref 35–150)
MAGNESIUM SERPL-MCNC: 2 MG/DL (ref 1.6–2.4)
MCH RBC QN AUTO: 26.2 PG (ref 26–34)
MCHC RBC AUTO-ENTMCNC: 32.8 G/DL (ref 30–36.5)
MCV RBC AUTO: 80 FL (ref 80–99)
NRBC # BLD: 0 K/UL (ref 0–0.01)
NRBC BLD-RTO: 0 PER 100 WBC
PLATELET # BLD AUTO: 436 K/UL (ref 150–400)
PMV BLD AUTO: 9.2 FL (ref 8.9–12.9)
POTASSIUM SERPL-SCNC: 4.1 MMOL/L (ref 3.5–5.1)
RBC # BLD AUTO: 5.19 M/UL (ref 3.8–5.2)
SODIUM SERPL-SCNC: 137 MMOL/L (ref 136–145)
TIBC SERPL-MCNC: 434 UG/DL (ref 250–450)
TSH SERPL DL<=0.05 MIU/L-ACNC: 3.28 UIU/ML (ref 0.36–3.74)
VIT B12 SERPL-MCNC: 620 PG/ML (ref 193–986)
WBC # BLD AUTO: 7.2 K/UL (ref 3.6–11)

## 2025-07-03 ENCOUNTER — RESULTS FOLLOW-UP (OUTPATIENT)
Age: 20
End: 2025-07-03

## (undated) DEVICE — (D)PREP SKN CHLRAPRP APPL 26ML -- CONVERT TO ITEM 371833

## (undated) DEVICE — HOOK LOCK LATEX FREE ELASTIC BANDAGE 4INX5YD

## (undated) DEVICE — SUTURE MCRYL SZ 4 0 L18IN ABSRB UD PC 5 L19MM 3 8 CIR SGL Y823G

## (undated) DEVICE — PADDING CAST 4 INX5 YD STRL

## (undated) DEVICE — GRADUATED BOWL: Brand: DEVON

## (undated) DEVICE — 4-PORT MANIFOLD: Brand: NEPTUNE 2

## (undated) DEVICE — SOLUTION IRRIG 3000ML LAC R FLX CONT

## (undated) DEVICE — FORCEPS BX L240CM JAW DIA2.8MM L CAP W/ NDL MIC MESH TOOTH

## (undated) DEVICE — GOWN,SIRUS,NONRNF,SETINSLV,2XL,18/CS: Brand: MEDLINE

## (undated) DEVICE — STRIP,CLOSURE,WOUND,MEDI-STRIP,1/2X4: Brand: MEDLINE

## (undated) DEVICE — INFECTION CONTROL KIT SYS

## (undated) DEVICE — SUT ETHBND 2-0 36IN SH DA GRN --

## (undated) DEVICE — DRAPE,EXTREMITY,89X128,STERILE: Brand: MEDLINE

## (undated) DEVICE — SKIN MARKER FINE TIP WITH RULER: Brand: DEVON

## (undated) DEVICE — DEVON™ KNEE AND BODY STRAP 60" X 3" (1.5 M X 7.6 CM): Brand: DEVON

## (undated) DEVICE — INTENDED FOR TISSUE SEPARATION, AND OTHER PROCEDURES THAT REQUIRE A SHARP SURGICAL BLADE TO PUNCTURE OR CUT.: Brand: BARD-PARKER ® CARBON RIB-BACK BLADES

## (undated) DEVICE — STERILE POLYISOPRENE POWDER-FREE SURGICAL GLOVES: Brand: PROTEXIS

## (undated) DEVICE — SUTURE FIBERWIRE 2-0 L18IN NONABSORBABLE BLU L17.9MM 3/8 AR7220

## (undated) DEVICE — DYONICS 25 INFLOW TUBE SET, 3 PER BOX

## (undated) DEVICE — PADDING CST 4INX4YD --

## (undated) DEVICE — SUTURE VCRL SZ 2-0 L27IN ABSRB UD L26MM SH 1/2 CIR J417H

## (undated) DEVICE — DYONICS 25 INFLOW/OUTFLOW TUBE                                    SET, SINGLE SUCTION, 3 PER BOX

## (undated) DEVICE — ARTHROSCOPY RICHMOND-LF: Brand: MEDLINE INDUSTRIES, INC.